# Patient Record
Sex: FEMALE | Race: WHITE | NOT HISPANIC OR LATINO | Employment: OTHER | URBAN - METROPOLITAN AREA
[De-identification: names, ages, dates, MRNs, and addresses within clinical notes are randomized per-mention and may not be internally consistent; named-entity substitution may affect disease eponyms.]

---

## 2021-04-06 DIAGNOSIS — Z23 ENCOUNTER FOR IMMUNIZATION: ICD-10-CM

## 2021-04-16 ENCOUNTER — IMMUNIZATIONS (OUTPATIENT)
Dept: FAMILY MEDICINE CLINIC | Facility: HOSPITAL | Age: 66
End: 2021-04-16

## 2021-04-16 DIAGNOSIS — Z23 ENCOUNTER FOR IMMUNIZATION: Primary | ICD-10-CM

## 2021-04-16 PROCEDURE — 0001A SARS-COV-2 / COVID-19 MRNA VACCINE (PFIZER-BIONTECH) 30 MCG: CPT

## 2021-04-16 PROCEDURE — 91300 SARS-COV-2 / COVID-19 MRNA VACCINE (PFIZER-BIONTECH) 30 MCG: CPT

## 2021-05-11 ENCOUNTER — IMMUNIZATIONS (OUTPATIENT)
Dept: FAMILY MEDICINE CLINIC | Facility: HOSPITAL | Age: 66
End: 2021-05-11

## 2021-05-11 DIAGNOSIS — Z23 ENCOUNTER FOR IMMUNIZATION: Primary | ICD-10-CM

## 2021-05-11 PROCEDURE — 91300 SARS-COV-2 / COVID-19 MRNA VACCINE (PFIZER-BIONTECH) 30 MCG: CPT

## 2021-05-11 PROCEDURE — 0002A SARS-COV-2 / COVID-19 MRNA VACCINE (PFIZER-BIONTECH) 30 MCG: CPT

## 2022-05-11 ENCOUNTER — HOSPITAL ENCOUNTER (INPATIENT)
Facility: HOSPITAL | Age: 67
LOS: 3 days | Discharge: HOME | DRG: 663 | End: 2022-05-14
Attending: EMERGENCY MEDICINE | Admitting: INTERNAL MEDICINE
Payer: COMMERCIAL

## 2022-05-11 ENCOUNTER — APPOINTMENT (EMERGENCY)
Dept: RADIOLOGY | Facility: HOSPITAL | Age: 67
DRG: 663 | End: 2022-05-11
Payer: COMMERCIAL

## 2022-05-11 ENCOUNTER — OFFICE VISIT (OUTPATIENT)
Dept: URGENT CARE | Facility: CLINIC | Age: 67
DRG: 663 | End: 2022-05-11
Payer: COMMERCIAL

## 2022-05-11 ENCOUNTER — APPOINTMENT (INPATIENT)
Dept: NON INVASIVE DIAGNOSTICS | Facility: HOSPITAL | Age: 67
DRG: 663 | End: 2022-05-11
Payer: COMMERCIAL

## 2022-05-11 VITALS
BODY MASS INDEX: 33.6 KG/M2 | HEART RATE: 113 BPM | SYSTOLIC BLOOD PRESSURE: 153 MMHG | WEIGHT: 240 LBS | DIASTOLIC BLOOD PRESSURE: 68 MMHG | OXYGEN SATURATION: 100 % | RESPIRATION RATE: 24 BRPM | TEMPERATURE: 98 F | HEIGHT: 71 IN

## 2022-05-11 DIAGNOSIS — I50.9 CHF EXACERBATION (HCC): ICD-10-CM

## 2022-05-11 DIAGNOSIS — R06.00 DYSPNEA: ICD-10-CM

## 2022-05-11 DIAGNOSIS — D50.9 MICROCYTIC ANEMIA: ICD-10-CM

## 2022-05-11 DIAGNOSIS — R60.0 LOWER EXTREMITY EDEMA: ICD-10-CM

## 2022-05-11 DIAGNOSIS — R60.9 EDEMA: ICD-10-CM

## 2022-05-11 DIAGNOSIS — D64.9 SYMPTOMATIC ANEMIA: Primary | ICD-10-CM

## 2022-05-11 DIAGNOSIS — I10 PRIMARY HYPERTENSION: ICD-10-CM

## 2022-05-11 DIAGNOSIS — D50.9 IRON DEFICIENCY ANEMIA: ICD-10-CM

## 2022-05-11 DIAGNOSIS — R06.00 DOE (DYSPNEA ON EXERTION): Primary | ICD-10-CM

## 2022-05-11 DIAGNOSIS — R06.00 DOE (DYSPNEA ON EXERTION): ICD-10-CM

## 2022-05-11 PROBLEM — R06.09 DOE (DYSPNEA ON EXERTION): Status: ACTIVE | Noted: 2022-05-11

## 2022-05-11 LAB
2HR DELTA HS TROPONIN: 1 NG/L
4HR DELTA HS TROPONIN: 3 NG/L
ABO GROUP BLD: NORMAL
ABO GROUP BLD: NORMAL
ALBUMIN SERPL BCP-MCNC: 3 G/DL (ref 3.5–5)
ALP SERPL-CCNC: 132 U/L (ref 46–116)
ALT SERPL W P-5'-P-CCNC: 59 U/L (ref 12–78)
ANION GAP SERPL CALCULATED.3IONS-SCNC: 11 MMOL/L (ref 4–13)
AORTIC ROOT: 3.4 CM
APICAL FOUR CHAMBER EJECTION FRACTION: 55 %
ASCENDING AORTA: 3.4 CM
AST SERPL W P-5'-P-CCNC: 47 U/L (ref 5–45)
BASOPHILS # BLD AUTO: 0.03 THOUSANDS/ΜL (ref 0–0.1)
BASOPHILS NFR BLD AUTO: 0 % (ref 0–1)
BILIRUB SERPL-MCNC: 0.82 MG/DL (ref 0.2–1)
BLD GP AB SCN SERPL QL: NEGATIVE
BUN SERPL-MCNC: 15 MG/DL (ref 5–25)
CALCIUM ALBUM COR SERPL-MCNC: 9.5 MG/DL (ref 8.3–10.1)
CALCIUM SERPL-MCNC: 8.7 MG/DL (ref 8.3–10.1)
CARDIAC TROPONIN I PNL SERPL HS: 10 NG/L
CARDIAC TROPONIN I PNL SERPL HS: 11 NG/L
CARDIAC TROPONIN I PNL SERPL HS: 13 NG/L
CHLORIDE SERPL-SCNC: 109 MMOL/L (ref 100–108)
CO2 SERPL-SCNC: 19 MMOL/L (ref 21–32)
CREAT SERPL-MCNC: 0.9 MG/DL (ref 0.6–1.3)
E WAVE DECELERATION TIME: 184 MS
EOSINOPHIL # BLD AUTO: 0.02 THOUSAND/ΜL (ref 0–0.61)
EOSINOPHIL NFR BLD AUTO: 0 % (ref 0–6)
ERYTHROCYTE [DISTWIDTH] IN BLOOD BY AUTOMATED COUNT: 21.9 % (ref 11.6–15.1)
FERRITIN SERPL-MCNC: 3 NG/ML (ref 8–388)
FRACTIONAL SHORTENING: 35 % (ref 28–44)
GFR SERPL CREATININE-BSD FRML MDRD: 67 ML/MIN/1.73SQ M
GLUCOSE SERPL-MCNC: 152 MG/DL (ref 65–140)
HCT VFR BLD AUTO: 12.7 % (ref 34.8–46.1)
HCT VFR BLD AUTO: 16 % (ref 34.8–46.1)
HGB BLD-MCNC: 3.2 G/DL (ref 11.5–15.4)
HGB BLD-MCNC: 4.2 G/DL (ref 11.5–15.4)
IMM GRANULOCYTES # BLD AUTO: 0.05 THOUSAND/UL (ref 0–0.2)
IMM GRANULOCYTES NFR BLD AUTO: 1 % (ref 0–2)
INTERVENTRICULAR SEPTUM IN DIASTOLE (PARASTERNAL SHORT AXIS VIEW): 1.1 CM
INTERVENTRICULAR SEPTUM: 1.1 CM (ref 0.6–1.1)
IRON SATN MFR SERPL: 3 % (ref 15–50)
IRON SERPL-MCNC: 12 UG/DL (ref 50–170)
LAAS-AP4: 26.3 CM2
LDH SERPL-CCNC: 283 U/L (ref 81–234)
LEFT ATRIUM SIZE: 3.9 CM
LEFT INTERNAL DIMENSION IN SYSTOLE: 3.3 CM (ref 2.1–4)
LEFT VENTRICULAR INTERNAL DIMENSION IN DIASTOLE: 5.1 CM (ref 3.5–6)
LEFT VENTRICULAR POSTERIOR WALL IN END DIASTOLE: 0.9 CM
LEFT VENTRICULAR STROKE VOLUME: 80 ML
LVSV (TEICH): 80 ML
LYMPHOCYTES # BLD AUTO: 1.31 THOUSANDS/ΜL (ref 0.6–4.47)
LYMPHOCYTES NFR BLD AUTO: 15 % (ref 14–44)
MCH RBC QN AUTO: 14.5 PG (ref 26.8–34.3)
MCHC RBC AUTO-ENTMCNC: 25.2 G/DL (ref 31.4–37.4)
MCV RBC AUTO: 58 FL (ref 82–98)
MONOCYTES # BLD AUTO: 0.61 THOUSAND/ΜL (ref 0.17–1.22)
MONOCYTES NFR BLD AUTO: 7 % (ref 4–12)
MV E'TISSUE VEL-SEP: 11 CM/S
MV PEAK A VEL: 1.32 M/S
MV PEAK E VEL: 117 CM/S
MV STENOSIS PRESSURE HALF TIME: 53 MS
MV VALVE AREA P 1/2 METHOD: 4.15 CM2
NEUTROPHILS # BLD AUTO: 6.85 THOUSANDS/ΜL (ref 1.85–7.62)
NEUTS SEG NFR BLD AUTO: 77 % (ref 43–75)
NRBC BLD AUTO-RTO: 3 /100 WBCS
NT-PROBNP SERPL-MCNC: 369 PG/ML
PLATELET # BLD AUTO: 469 THOUSANDS/UL (ref 149–390)
PMV BLD AUTO: 11 FL (ref 8.9–12.7)
POTASSIUM SERPL-SCNC: 3.4 MMOL/L (ref 3.5–5.3)
PROT SERPL-MCNC: 6.6 G/DL (ref 6.4–8.2)
RBC # BLD AUTO: 2.21 MILLION/UL (ref 3.81–5.12)
RETICS # AUTO: ABNORMAL 10*3/UL (ref 14097–95744)
RETICS # CALC: 3.19 % (ref 0.37–1.87)
RH BLD: POSITIVE
RH BLD: POSITIVE
RIGHT ATRIUM AREA SYSTOLE A4C: 21.8 CM2
RIGHT VENTRICLE ID DIMENSION: 4.3 CM
SL CV LV EF: 65
SL CV PED ECHO LEFT VENTRICLE DIASTOLIC VOLUME (MOD BIPLANE) 2D: 123 ML
SL CV PED ECHO LEFT VENTRICLE SYSTOLIC VOLUME (MOD BIPLANE) 2D: 43 ML
SODIUM SERPL-SCNC: 139 MMOL/L (ref 136–145)
SPECIMEN EXPIRATION DATE: NORMAL
TIBC SERPL-MCNC: 467 UG/DL (ref 250–450)
TR MAX PG: 39 MMHG
TR PEAK VELOCITY: 3.1 M/S
TRICUSPID VALVE PEAK REGURGITATION VELOCITY: 3.1 M/S
WBC # BLD AUTO: 8.87 THOUSAND/UL (ref 4.31–10.16)

## 2022-05-11 PROCEDURE — 82728 ASSAY OF FERRITIN: CPT | Performed by: STUDENT IN AN ORGANIZED HEALTH CARE EDUCATION/TRAINING PROGRAM

## 2022-05-11 PROCEDURE — 86901 BLOOD TYPING SEROLOGIC RH(D): CPT

## 2022-05-11 PROCEDURE — 84484 ASSAY OF TROPONIN QUANT: CPT

## 2022-05-11 PROCEDURE — 86920 COMPATIBILITY TEST SPIN: CPT

## 2022-05-11 PROCEDURE — 93306 TTE W/DOPPLER COMPLETE: CPT | Performed by: INTERNAL MEDICINE

## 2022-05-11 PROCEDURE — 85014 HEMATOCRIT: CPT | Performed by: STUDENT IN AN ORGANIZED HEALTH CARE EDUCATION/TRAINING PROGRAM

## 2022-05-11 PROCEDURE — 93005 ELECTROCARDIOGRAM TRACING: CPT

## 2022-05-11 PROCEDURE — 83540 ASSAY OF IRON: CPT | Performed by: STUDENT IN AN ORGANIZED HEALTH CARE EDUCATION/TRAINING PROGRAM

## 2022-05-11 PROCEDURE — 83880 ASSAY OF NATRIURETIC PEPTIDE: CPT

## 2022-05-11 PROCEDURE — 86900 BLOOD TYPING SEROLOGIC ABO: CPT

## 2022-05-11 PROCEDURE — P9016 RBC LEUKOCYTES REDUCED: HCPCS

## 2022-05-11 PROCEDURE — 93306 TTE W/DOPPLER COMPLETE: CPT

## 2022-05-11 PROCEDURE — G0382 LEV 3 HOSP TYPE B ED VISIT: HCPCS | Performed by: FAMILY MEDICINE

## 2022-05-11 PROCEDURE — 85018 HEMOGLOBIN: CPT | Performed by: STUDENT IN AN ORGANIZED HEALTH CARE EDUCATION/TRAINING PROGRAM

## 2022-05-11 PROCEDURE — 36430 TRANSFUSION BLD/BLD COMPNT: CPT

## 2022-05-11 PROCEDURE — 83615 LACTATE (LD) (LDH) ENZYME: CPT | Performed by: STUDENT IN AN ORGANIZED HEALTH CARE EDUCATION/TRAINING PROGRAM

## 2022-05-11 PROCEDURE — 36415 COLL VENOUS BLD VENIPUNCTURE: CPT

## 2022-05-11 PROCEDURE — 80053 COMPREHEN METABOLIC PANEL: CPT

## 2022-05-11 PROCEDURE — 86850 RBC ANTIBODY SCREEN: CPT

## 2022-05-11 PROCEDURE — 85025 COMPLETE CBC W/AUTO DIFF WBC: CPT

## 2022-05-11 PROCEDURE — 71046 X-RAY EXAM CHEST 2 VIEWS: CPT

## 2022-05-11 PROCEDURE — 84484 ASSAY OF TROPONIN QUANT: CPT | Performed by: STUDENT IN AN ORGANIZED HEALTH CARE EDUCATION/TRAINING PROGRAM

## 2022-05-11 PROCEDURE — 99285 EMERGENCY DEPT VISIT HI MDM: CPT | Performed by: EMERGENCY MEDICINE

## 2022-05-11 PROCEDURE — 30233N1 TRANSFUSION OF NONAUTOLOGOUS RED BLOOD CELLS INTO PERIPHERAL VEIN, PERCUTANEOUS APPROACH: ICD-10-PCS | Performed by: STUDENT IN AN ORGANIZED HEALTH CARE EDUCATION/TRAINING PROGRAM

## 2022-05-11 PROCEDURE — 80053 COMPREHEN METABOLIC PANEL: CPT | Performed by: STUDENT IN AN ORGANIZED HEALTH CARE EDUCATION/TRAINING PROGRAM

## 2022-05-11 PROCEDURE — 85045 AUTOMATED RETICULOCYTE COUNT: CPT | Performed by: STUDENT IN AN ORGANIZED HEALTH CARE EDUCATION/TRAINING PROGRAM

## 2022-05-11 PROCEDURE — 83550 IRON BINDING TEST: CPT | Performed by: STUDENT IN AN ORGANIZED HEALTH CARE EDUCATION/TRAINING PROGRAM

## 2022-05-11 PROCEDURE — 99285 EMERGENCY DEPT VISIT HI MDM: CPT

## 2022-05-11 PROCEDURE — 96374 THER/PROPH/DIAG INJ IV PUSH: CPT

## 2022-05-11 PROCEDURE — 85049 AUTOMATED PLATELET COUNT: CPT | Performed by: INTERNAL MEDICINE

## 2022-05-11 RX ORDER — LABETALOL HYDROCHLORIDE 5 MG/ML
10 INJECTION, SOLUTION INTRAVENOUS EVERY 4 HOURS PRN
Status: DISCONTINUED | OUTPATIENT
Start: 2022-05-11 | End: 2022-05-14 | Stop reason: HOSPADM

## 2022-05-11 RX ORDER — ENOXAPARIN SODIUM 100 MG/ML
40 INJECTION SUBCUTANEOUS DAILY
Status: DISCONTINUED | OUTPATIENT
Start: 2022-05-11 | End: 2022-05-14 | Stop reason: HOSPADM

## 2022-05-11 RX ORDER — HYDRALAZINE HYDROCHLORIDE 20 MG/ML
5 INJECTION INTRAMUSCULAR; INTRAVENOUS EVERY 6 HOURS PRN
Status: DISCONTINUED | OUTPATIENT
Start: 2022-05-11 | End: 2022-05-14 | Stop reason: HOSPADM

## 2022-05-11 RX ORDER — POLYETHYLENE GLYCOL 3350 17 G/17G
17 POWDER, FOR SOLUTION ORAL DAILY
Status: DISCONTINUED | OUTPATIENT
Start: 2022-05-11 | End: 2022-05-14 | Stop reason: HOSPADM

## 2022-05-11 RX ORDER — SENNOSIDES 8.6 MG
1 TABLET ORAL DAILY
Status: DISCONTINUED | OUTPATIENT
Start: 2022-05-11 | End: 2022-05-14 | Stop reason: HOSPADM

## 2022-05-11 RX ORDER — LOSARTAN POTASSIUM 50 MG/1
50 TABLET ORAL DAILY
Status: DISCONTINUED | OUTPATIENT
Start: 2022-05-11 | End: 2022-05-13

## 2022-05-11 RX ORDER — NITROGLYCERIN 0.4 MG/1
0.4 TABLET SUBLINGUAL
Status: DISCONTINUED | OUTPATIENT
Start: 2022-05-11 | End: 2022-05-14 | Stop reason: HOSPADM

## 2022-05-11 RX ORDER — FUROSEMIDE 10 MG/ML
40 INJECTION INTRAMUSCULAR; INTRAVENOUS ONCE
Status: COMPLETED | OUTPATIENT
Start: 2022-05-11 | End: 2022-05-11

## 2022-05-11 RX ORDER — POTASSIUM CHLORIDE 20 MEQ/1
20 TABLET, EXTENDED RELEASE ORAL DAILY
Status: DISCONTINUED | OUTPATIENT
Start: 2022-05-11 | End: 2022-05-14 | Stop reason: HOSPADM

## 2022-05-11 RX ORDER — FUROSEMIDE 10 MG/ML
40 INJECTION INTRAMUSCULAR; INTRAVENOUS 2 TIMES DAILY
Status: DISCONTINUED | OUTPATIENT
Start: 2022-05-11 | End: 2022-05-14 | Stop reason: HOSPADM

## 2022-05-11 RX ORDER — ALBUTEROL SULFATE 90 UG/1
2 AEROSOL, METERED RESPIRATORY (INHALATION) EVERY 4 HOURS PRN
Status: DISCONTINUED | OUTPATIENT
Start: 2022-05-11 | End: 2022-05-14 | Stop reason: HOSPADM

## 2022-05-11 RX ADMIN — ENOXAPARIN SODIUM 40 MG: 40 INJECTION SUBCUTANEOUS at 16:39

## 2022-05-11 RX ADMIN — LOSARTAN POTASSIUM 50 MG: 50 TABLET, FILM COATED ORAL at 16:54

## 2022-05-11 RX ADMIN — POTASSIUM CHLORIDE 20 MEQ: 1500 TABLET, EXTENDED RELEASE ORAL at 16:39

## 2022-05-11 RX ADMIN — HYDRALAZINE HYDROCHLORIDE 5 MG: 20 INJECTION, SOLUTION INTRAMUSCULAR; INTRAVENOUS at 19:05

## 2022-05-11 RX ADMIN — FUROSEMIDE 40 MG: 10 INJECTION, SOLUTION INTRAMUSCULAR; INTRAVENOUS at 19:06

## 2022-05-11 RX ADMIN — FUROSEMIDE 40 MG: 10 INJECTION, SOLUTION INTRAMUSCULAR; INTRAVENOUS at 14:04

## 2022-05-11 RX ADMIN — Medication 12.5 MG: at 21:11

## 2022-05-11 NOTE — ED NOTES
Pt updated on plan of care  Awaiting blood work to result to send for blood for transfusion pt verbalized understanding   Admitting provider at bedside      Unruly Bañuelos RN  05/11/22 0372

## 2022-05-11 NOTE — PROGRESS NOTES
[facility]        NAME: Elis Cruz is a 61 y o  female  : 1955    MRN: [id number]  DATE: May 11, 2022  TIME: 1:09 PM    Assessment and Plan   FALLON (dyspnea on exertion) [R06 00]  1  FALLON (dyspnea on exertion)           Patient Instructions   Tachycardia at rate of 130s with O2sat low 90s with short ambulation around the nurses station in the office  3+ pitting edema on the lower extremities bilaterally, skin is extremely pale  ENT has been called and patient will be transported to the emergency room for further evaluation and management  Patient in agreement  Follow up with PCP in 3-5 days  Proceed to  ER if symptoms worsen  Chief Complaint     Chief Complaint   Patient presents with    Shortness of Breath     Pt states that over the last week she has been very short of breath with any type of exertion  Pt also states that her legs have swollen so big that she can not fit into her pants  Lower Legs show pitting edema, Pt estimates that she has gained approx 20 lbs over the week  History of Present Illness       61-year-old female with 1 week history of increasing shortness of breath and increased swelling of her legs  She states that her legs have swollen so much that none of her pants now fit her as of the past two weeks  She states with walking she gets increasingly short of breath and chest tightness  She states when she sits and does not move her symptoms quickly resolved  Denies any headaches nausea or vomiting  Review of Systems   Review of Systems   Constitutional: Positive for diaphoresis and fatigue  Negative for fever  HENT: Negative  Eyes: Negative  Respiratory: Positive for chest tightness, shortness of breath and wheezing  Cardiovascular: Positive for leg swelling  Gastrointestinal: Negative  Genitourinary: Negative  Skin: Positive for color change  Allergic/Immunologic: Negative  Neurological: Negative      Hematological: Negative  Psychiatric/Behavioral: Negative  Current Medications     No current outpatient medications on file  Current Allergies     Allergies as of 05/11/2022 - Reviewed 05/11/2022   Allergen Reaction Noted    Penicillins Hives 05/11/2022    Shellfish-derived products - food allergy GI Intolerance 05/11/2022    Latex Rash 05/11/2022            The following portions of the patient's history were reviewed and updated as appropriate: allergies, current medications, past family history, past medical history, past social history, past surgical history and problem list      History reviewed  No pertinent past medical history  Past Surgical History:   Procedure Laterality Date    TONSILLECTOMY         History reviewed  No pertinent family history  Medications have been verified  Objective   /68   Pulse (!) 113   Temp 98 °F (36 7 °C)   Resp (!) 24   Ht 5' 11" (1 803 m)   Wt 109 kg (240 lb)   SpO2 100%   BMI 33 47 kg/m²   No LMP recorded  Physical Exam     Physical Exam  Vitals and nursing note reviewed  Constitutional:       Appearance: She is well-developed  HENT:      Head: Normocephalic  Eyes:      Pupils: Pupils are equal, round, and reactive to light  Cardiovascular:      Rate and Rhythm: Regular rhythm  Tachycardia present  Pulmonary:      Effort: Pulmonary effort is normal  Tachypnea present  Breath sounds: Examination of the right-lower field reveals wheezing  Examination of the left-lower field reveals wheezing  Wheezing present  Abdominal:      Tenderness: There is no abdominal tenderness  Musculoskeletal:         General: Normal range of motion  Right lower leg: Edema present  Left lower leg: Edema present  Skin:     General: Skin is warm and dry  Capillary Refill: Capillary refill takes 2 to 3 seconds  Coloration: Skin is pale  Neurological:      Mental Status: She is alert and oriented to person, place, and time

## 2022-05-11 NOTE — ED PROVIDER NOTES
History  Chief Complaint   Patient presents with    Shortness of Breath     PT reports increased SOB and leg swelling for the past few weeks  Asha Haider is a 61 y o  with no known PMH who presents with complaints of shortness of breath  She reports this has been going on for the past several weeks  She reports associated cough, swelling of her legs, dyspnea on exertion  She has no known cardiac history and has never seen a cardiologist   She denies any recent hospitalizations in the past year  She denies any fevers, chills, sick contacts, episodes syncope, abdominal pain, nausea, vomiting, diaphoresis  She is vaccinated against COVID  She reports she is still making urine however she is urinating less  She has no known history of kidney disease  She does not take any medications regularly  She has not seen a primary care physician for many years  The patient also denies any symptoms of fevers, chills, headache, vision changes, dizziness,weakness in the arms or legs, blood in the urine or stool, difficulties walking, or sick contacts  The pt has no other complaints at this time  Pt denies any PMH or PSH          None       No past medical history on file  Past Surgical History:   Procedure Laterality Date    TONSILLECTOMY         No family history on file  I have reviewed and agree with the history as documented  E-Cigarette/Vaping     E-Cigarette/Vaping Substances     Social History     Tobacco Use    Smoking status: Never Smoker    Smokeless tobacco: Never Used        Review of Systems   Constitutional: Negative for chills and fever  HENT: Negative for ear pain and sore throat  Eyes: Negative for pain and visual disturbance  Respiratory: Positive for cough and shortness of breath  Cardiovascular: Negative for chest pain and palpitations  Gastrointestinal: Negative for abdominal pain and vomiting  Genitourinary: Negative for dysuria and hematuria  Musculoskeletal: Negative for back pain  Skin: Positive for color change  Negative for rash  Neurological: Negative for syncope  All other systems reviewed and are negative  Physical Exam  ED Triage Vitals   Temperature Pulse Respirations Blood Pressure SpO2   05/11/22 1339 05/11/22 1339 05/11/22 1339 05/11/22 1340 05/11/22 1339   98 1 °F (36 7 °C) (!) 113 22 (!) 177/77 100 %      Temp Source Heart Rate Source Patient Position - Orthostatic VS BP Location FiO2 (%)   05/11/22 1339 05/11/22 1339 05/11/22 1339 05/11/22 1339 --   Oral Monitor Lying Right arm       Pain Score       05/11/22 1339       No Pain             Orthostatic Vital Signs  Vitals:    05/12/22 0842 05/12/22 0905 05/12/22 1224 05/12/22 1225   BP: 161/88  139/86 139/86   Pulse: 80 73 74 74   Patient Position - Orthostatic VS:           Physical Exam  Vitals and nursing note reviewed  Constitutional:       General: She is not in acute distress  Appearance: She is well-developed  HENT:      Head: Normocephalic and atraumatic  Eyes:      Extraocular Movements: Extraocular movements intact  Conjunctiva/sclera: Conjunctivae normal    Cardiovascular:      Rate and Rhythm: Regular rhythm  Tachycardia present  Heart sounds: No murmur heard  Pulmonary:      Effort: Pulmonary effort is normal  No respiratory distress  Breath sounds: Rales present  No decreased breath sounds, wheezing or rhonchi  Abdominal:      Palpations: Abdomen is soft  Tenderness: There is no abdominal tenderness  There is no guarding or rebound  Musculoskeletal:      Cervical back: Neck supple  Right lower leg: Edema present  Left lower leg: Edema present  Comments: Bilateral 3+ pitting edema up to mid thigh   Skin:     General: Skin is warm and dry  Coloration: Skin is pale  Neurological:      General: No focal deficit present  Mental Status: She is alert           ED Medications  Medications   senna (SENOKOT) tablet 8 6 mg (8 6 mg Oral Not Given 5/12/22 0907)   polyethylene glycol (MIRALAX) packet 17 g (17 g Oral Not Given 5/12/22 0907)   furosemide (LASIX) injection 40 mg (40 mg Intravenous Given 5/12/22 0906)   nitroglycerin (NITROSTAT) SL tablet 0 4 mg (has no administration in time range)   losartan (COZAAR) tablet 50 mg (50 mg Oral Given 5/12/22 0906)   enoxaparin (LOVENOX) subcutaneous injection 40 mg (40 mg Subcutaneous Given 5/12/22 0907)   potassium chloride (K-DUR,KLOR-CON) CR tablet 20 mEq (20 mEq Oral Given 5/12/22 0907)   hydrALAZINE (APRESOLINE) injection 5 mg (5 mg Intravenous Given 5/11/22 1905)   labetalol (NORMODYNE) injection 10 mg (has no administration in time range)   albuterol (PROVENTIL HFA,VENTOLIN HFA) inhaler 2 puff (2 puffs Inhalation Given 5/12/22 0218)   polyethylene glycol (GOLYTELY) bowel prep 4,000 mL (has no administration in time range)   bisacodyl (DULCOLAX) EC tablet 20 mg (has no administration in time range)   bisacodyl (DULCOLAX) EC tablet 20 mg (has no administration in time range)   magnesium citrate (CITROMA) oral solution 296 mL (has no administration in time range)   furosemide (LASIX) injection 40 mg (40 mg Intravenous Given 5/11/22 1404)   potassium chloride (K-DUR,KLOR-CON) CR tablet 40 mEq (40 mEq Oral Given 5/12/22 0311)   ondansetron (ZOFRAN) injection 4 mg (4 mg Intravenous Given 5/12/22 0233)   furosemide (LASIX) injection 40 mg (40 mg Intravenous Given 5/12/22 0614)   magnesium citrate (CITROMA) oral solution 296 mL (296 mL Oral Given 5/12/22 1157)       Diagnostic Studies  Results Reviewed     Procedure Component Value Units Date/Time    TSH, 3rd generation [[id number]]  (Normal) Collected: 05/12/22 0524    Lab Status: Final result Specimen: Blood from Arm, Left Updated: 05/12/22 0637     TSH 3RD GENERATON 1 100 uIU/mL     Narrative:      Patients undergoing fluorescein dye angiography may retain small amounts of fluorescein in the body for 48-72 hours post procedure  Samples containing fluorescein can produce falsely depressed TSH values  If the patient had this procedure,a specimen should be resubmitted post fluorescein clearance        Basic metabolic panel [[id number]] Collected: 05/12/22 0524    Lab Status: Final result Specimen: Blood from Arm, Left Updated: 05/12/22 3253     Sodium 139 mmol/L      Potassium 3 9 mmol/L      Chloride 108 mmol/L      CO2 24 mmol/L      ANION GAP 7 mmol/L      BUN 14 mg/dL      Creatinine 0 80 mg/dL      Glucose 107 mg/dL      Calcium 8 7 mg/dL      eGFR 78 ml/min/1 73sq m     Narrative:      [facility] guidelines for Chronic Kidney Disease (CKD):     Stage 1 with normal or high GFR (GFR > 90 mL/min/1 73 square meters)    Stage 2 Mild CKD (GFR = 60-89 mL/min/1 73 square meters)    Stage 3A Moderate CKD (GFR = 45-59 mL/min/1 73 square meters)    Stage 3B Moderate CKD (GFR = 30-44 mL/min/1 73 square meters)    Stage 4 Severe CKD (GFR = 15-29 mL/min/1 73 square meters)    Stage 5 End Stage CKD (GFR <15 mL/min/1 73 square meters)  Note: GFR calculation is accurate only with a steady state creatinine    CBC and differential [[id number]]  (Abnormal) Collected: 05/12/22 0524    Lab Status: Final result Specimen: Blood from Arm, Left Updated: 05/12/22 0602     WBC 8 50 Thousand/uL      RBC 3 26 Million/uL      Hemoglobin 6 5 g/dL      Hematocrit 22 6 %      MCV 69 fL      MCH 19 9 pg      MCHC 28 8 g/dL      RDW 30 5 %      MPV 11 4 fL      Platelets 990 Thousands/uL      nRBC 5 /100 WBCs      Neutrophils Relative 66 %      Immat GRANS % 1 %      Lymphocytes Relative 21 %      Monocytes Relative 10 %      Eosinophils Relative 1 %      Basophils Relative 1 %      Neutrophils Absolute 5 66 Thousands/µL      Immature Grans Absolute 0 10 Thousand/uL      Lymphocytes Absolute 1 74 Thousands/µL      Monocytes Absolute 0 83 Thousand/µL      Eosinophils Absolute 0 09 Thousand/µL      Basophils Absolute 0 08 Thousands/µL Hemoglobin and hematocrit, blood [[id number]]  (Abnormal) Collected: 05/11/22 2305    Lab Status: Final result Specimen: Blood from Arm, Right Updated: 05/12/22 0004     Hemoglobin 4 1 g/dL      Hematocrit 15 5 %     Hemoglobin and hematocrit, blood [[id number]]  (Abnormal) Collected: 05/11/22 2222    Lab Status: Final result Specimen: Blood from Arm, Left Updated: 05/11/22 2249     Hemoglobin 4 2 g/dL      Hematocrit 16 0 %     HS Troponin I 4hr [[id number]]  (Normal) Collected: 05/11/22 1735    Lab Status: Final result Specimen: Blood from Arm, Right Updated: 05/11/22 1811     hs TnI 4hr 13 ng/L      Delta 4hr hsTnI 3 ng/L     Retic Count [[id number]]  (Abnormal) Collected: 05/11/22 1358    Lab Status: Final result Specimen: Blood from Arm, Right Updated: 05/11/22 1716     Retic Ct Abs 71,500     Retic Ct Pct 3 19 %     HS Troponin I 2hr [[id number]]  (Normal) Collected: 05/11/22 1612    Lab Status: Final result Specimen: Blood from Arm, Right Updated: 05/11/22 1651     hs TnI 2hr 11 ng/L      Delta 2hr hsTnI 1 ng/L     Iron Saturation % [[id number]]  (Abnormal) Collected: 05/11/22 1358    Lab Status: Final result Specimen: Blood from Arm, Right Updated: 05/11/22 1645     Iron Saturation 3 %      TIBC 467 ug/dL      Iron 12 ug/dL     Lactate dehydrogenase [[id number]]  (Abnormal) Collected: 05/11/22 1358    Lab Status: Final result Specimen: Blood from Arm, Right Updated: 05/11/22 1645      U/L     Ferritin [[id number]]  (Abnormal) Collected: 05/11/22 1358    Lab Status: Final result Specimen: Blood from Arm, Right Updated: 05/11/22 1645     Ferritin 3 ng/mL     HS Troponin 0hr (reflex protocol) [[id number]]  (Normal) Collected: 05/11/22 1358    Lab Status: Final result Specimen: Blood from Arm, Right Updated: 05/11/22 1436     hs TnI 0hr 10 ng/L     Comprehensive metabolic panel [[id number]]  (Abnormal) Collected: 05/11/22 1829    Lab Status: Final result Specimen: Blood from Arm, Right Updated: 05/11/22 2852 Sodium 139 mmol/L      Potassium 3 4 mmol/L      Chloride 109 mmol/L      CO2 19 mmol/L      ANION GAP 11 mmol/L      BUN 15 mg/dL      Creatinine 0 90 mg/dL      Glucose 152 mg/dL      Calcium 8 7 mg/dL      Corrected Calcium 9 5 mg/dL      AST 47 U/L      ALT 59 U/L      Alkaline Phosphatase 132 U/L      Total Protein 6 6 g/dL      Albumin 3 0 g/dL      Total Bilirubin 0 82 mg/dL      eGFR 67 ml/min/1 73sq m     Narrative:      [employer] guidelines for Chronic Kidney Disease (CKD):     Stage 1 with normal or high GFR (GFR > 90 mL/min/1 73 square meters)    Stage 2 Mild CKD (GFR = 60-89 mL/min/1 73 square meters)    Stage 3A Moderate CKD (GFR = 45-59 mL/min/1 73 square meters)    Stage 3B Moderate CKD (GFR = 30-44 mL/min/1 73 square meters)    Stage 4 Severe CKD (GFR = 15-29 mL/min/1 73 square meters)    Stage 5 End Stage CKD (GFR <15 mL/min/1 73 square meters)  Note: GFR calculation is accurate only with a steady state creatinine    NT-BNP PRO [[id number]]  (Abnormal) Collected: 05/11/22 1358    Lab Status: Final result Specimen: Blood from Arm, Right Updated: 05/11/22 1430     NT-proBNP 369 pg/mL     CBC and differential [[id number]]  (Abnormal) Collected: 05/11/22 1358    Lab Status: Final result Specimen: Blood from Arm, Right Updated: 05/11/22 1422     WBC 8 87 Thousand/uL      RBC 2 21 Million/uL      Hemoglobin 3 2 g/dL      Hematocrit 12 7 %      MCV 58 fL      MCH 14 5 pg      MCHC 25 2 g/dL      RDW 21 9 %      MPV 11 0 fL      Platelets 990 Thousands/uL      nRBC 3 /100 WBCs      Neutrophils Relative 77 %      Immat GRANS % 1 %      Lymphocytes Relative 15 %      Monocytes Relative 7 %      Eosinophils Relative 0 %      Basophils Relative 0 %      Neutrophils Absolute 6 85 Thousands/µL      Immature Grans Absolute 0 05 Thousand/uL      Lymphocytes Absolute 1 31 Thousands/µL      Monocytes Absolute 0 61 Thousand/µL      Eosinophils Absolute 0 02 Thousand/µL      Basophils Absolute 0 03 Thousands/µL                  CT abdomen pelvis wo contrast   Final Result by Mariam Oh MD (05/12 4103)      No acute intra-abdominal pathology to account for anemia  Specifically, no intraperitoneal or retroperitoneal hematoma  Hiatal hernia  The entire gastric fundus is contained within the hernia sac and the gastric wall is somewhat thickened  Trace left greater than right pleural effusions  Mild body wall edema  Cholelithiasis  Few scattered colonic diverticula  Workstation performed: [id number]         XR chest 2 views   ED Interpretation by Elpidio Patton DO (05/11 0600)   Abnormal   Chest x-ray interpreted me shows bilateral pleural effusions, left greater than right, trace pulmonary vascular congestion, no focal consolidation, no old available for comparison      Final Result by Jen Morillo MD (05/11 1027)      Small bilateral pleural effusions (left worse than right) with bibasilar atelectasis  Workstation performed: [id number]               Procedures  Procedures      ED Course  ED Course as of 05/12/22 1417   Wed May 11, 2022   1423 Hemoglobin(!!): 3 2   1425 Transfusing 2 U   1434 NT-proBNP(!): 369   1437 Glucose, Random(!): 152   1455 Will get the pt admitted to SOD                          Initial Sepsis Screening     Row Name 05/11/22 1424                Is the patient's history suggestive of a new or worsening infection?  No  -AR        Suspected source of infection --        Are two or more of the following signs & symptoms of infection both present and new to the patient? --        Indicate SIRS criteria --        If the answer is yes to both questions, suspicion of sepsis is present --        If severe sepsis is present AND tissue hypoperfusion perists in the hour after fluid resuscitation or lactate > 4, the patient meets criteria for SEPTIC SHOCK --        Are any of the following organ dysfunction criteria present within 6 hours of suspected infection and SIRS criteria that are NOT considered to be chronic conditions? --        Organ dysfunction --        Date of presentation of severe sepsis --        Time of presentation of severe sepsis --        Tissue hypoperfusion persists in the hour after crystalloid fluid administration, evidenced, by either: --        Was hypotension present within one hour of the conclusion of crystalloid fluid administration? --        Date of presentation of septic shock --        Time of presentation of septic shock --              User Key  (r) = Recorded By, (t) = Taken By, (c) = Cosigned By    [id number]  Name Provider Type    [address] [place] [address] Physician                          MDM  Number of Diagnoses or Management Options  CHF exacerbation ([facility] )  Dyspnea  Edema  Symptomatic anemia  Diagnosis management comments: Gadiel Chong is a 61 y o  who presents with complaints of shortness of breath    Vital signs are remarkable for tachycardia, hypertension, physical exam shows the patient is pale and exhibited anasarca    ED Initial Impression:  Overall this appears consistent with a classic CHF exacerbation, however the differential also includes anemia versus ACS versus COPD versus infectious etiology versus other  ED Management:  Cardiac workup, bedside ultrasound, 40 mg Lasix    Reassessment:  Patient's workup was remarkable for profound anemia of 3 2  In addition to an elevated BNP  This was consistent with symptomatic anemia  Given this in her lack of cardiac evaluation in the past   Opted to recommend admission  She was transfused 2U of PRBC in the ER  Discussed the patient's case with the SOD service who agreed to admit the patient for further care and evaluation  The patient was also stable throughout their ED course and suffered from no acute changes and had no further questions or concerns from the ED team's standpoint        I discussed the above care and treatment plan with the patient and answered all of their relevant questions  Reviewed test results and imaging findings, as well as pertinent details of the treatment plan as described above  She expressed understanding, was agreeable to the plan of care, and had no further questions or concerns  Portions of the record may have been created with voice recognition software  Occasional wrong word or "sound a like" substitutions may have occurred due to the inherent limitations of voice recognition software  Read the chart carefully and recognize, using context, where substitutions have occurred      Disposition  Final diagnoses:   Symptomatic anemia   CHF exacerbation ([facility] )   Dyspnea   Edema     Time reflects when diagnosis was documented in both MDM as applicable and the Disposition within this note     Time User Action Codes Description Comment    5/11/2022  3:03 PM Virgia Large Add [D64 9] Symptomatic anemia     5/11/2022  3:04 PM Virgia Large Add [I50 9] [facility]     5/11/2022  3:04 PM Virgia Large Add [R06 00] Dyspnea     5/11/2022  3:04 PM Virgia Large Add [R60 9] Edema     5/11/2022  3:11 PM Minnix, Hyun Add [R06 00] FALLON (dyspnea on exertion)     5/11/2022  5:11 PM Minnix, Hyun Add [D50 9] Iron deficiency anemia     5/12/2022  9:49 AM Aziza Singleton Add [D50 9] Microcytic anemia       ED Disposition     ED Disposition   Admit    Condition   Stable    Date/Time   Wed May 11, 2022  3:03 PM    Comment   Case was discussed with SOD and the patient's admission status was agreed to be Admission Status: inpatient status to the service of Dr Jannie Yee   Follow-up Information    None         There are no discharge medications for this patient  No discharge procedures on file  PDMP Review     None           ED Provider  Attending physically available and evaluated Si [place]  I managed the patient along with the ED Attending      Electronically Signed by         Zuleika Cohen MD  05/11/22 [address], [place]  05/12/22 5343

## 2022-05-11 NOTE — ED ATTENDING ATTESTATION
5/11/2022  I, Sal Reina DO, saw and evaluated the patient  I have discussed the patient with the resident/non-physician practitioner and agree with the resident's/non-physician practitioner's findings, Plan of Care, and MDM as documented in the resident's/non-physician practitioner's note, except where noted  All available labs and Radiology studies were reviewed  I was present for key portions of any procedure(s) performed by the resident/non-physician practitioner and I was immediately available to provide assistance  At this point I agree with the current assessment done in the Emergency Department  I have conducted an independent evaluation of this patient a history and physical is as follows:    Patient is a 43-year-old female accompanied by her mother  The patient says about 4 months ago she noticed some very slight dyspnea on exertion if she would climb stairs rapidly, she says that continued and has progressed in the last 3 or 4 weeks she has had much worsening dyspnea on exertion  She has also noticed now that when she climbs stairs forcefully or hard that she gets some mild chest pain which resolves when she sits and rest   She said that when she rests she is asymptomatic  She has noticed which she believes about a 20 or 30 lb weight gain secondary to significant including least fluid retention in her legs bilaterally  She says if she rests her legs and elevates them sometimes of fluid will decreased somewhat and it typically is less in the morning  She has some slight occasional cough, which is unchanged  No fever, no chills, no myalgias, no arthralgias  No palpitations  Slight orthopnea  She says that she does not go to doctors and has not seen anyone for this, prior to going to urgent care today and was sent to the ED      General:  Patient is well-appearing  Head:  Atraumatic  Eyes:  Conjunctiva pink  ENT:  Mucous membranes are moist  Neck:  Supple  Cardiac:  S1-S2, without murmurs  Lungs:  Bibasilar rales  Abdomen:  Soft, nontender, normal bowel sounds, no CVA tenderness, no tympany, no rigidity, no guarding  Extremities:  Normal range of motion, diffuse pitting pedal edema up to the thighs bilaterally  No calf asymmetry  Neurologic:  Awake, fluent speech, normal comprehension  AAOx3  Skin:  Pale warm and dry  Psychiatric:  Alert, pleasant, cooperative            ED Course  ED Course as of 05/12/22 1416   Wed May 11, 2022   1448 On reassessment, no change the above findings  Discussed with patient about her significant anemia and need for blood transfusion, she consented  She indicated she has not noticed any dark or tarry stools, no blood in the stool, no bloody noses or easy bruising elsewhere  Patient pale on exam, symptoms suggestive of CHF with significant peripheral edema, also may represent underlying anemia  Given Lasix for presumed CHF given physical examination    Chest x-ray interpreted me shows trace pulmonary vascular congestion, left greater than right pleural effusions, no old available for comparison    Labs remarkable for significant anemia, hemoglobin of 3 2, patient consented for blood transfusion, blood transfusion ordered  Patient transfused,    Patient remaining stable, admitted for further care      Critical Care Time  Procedures  I consider this patient to be critically ill given her significant anemia with hemoglobin of 3 2   32 minutes of critical care time required for the care this patient  Please see separate note for details

## 2022-05-11 NOTE — ASSESSMENT & PLAN NOTE
Hemoglobin 3 2 and MCV 58  Iron panel consistent with severe iron deficiency with single digit ferritin  Denies hematuria, hematochezia, NSAID use, hematemesis, cancer history  Never had colonoscopy  Does endorse single episode of black stool several weeks ago, has not recurred  Denies any history of abnormal vaginal bleeding  Patient does endorse decades long history of anemia, although she denies having symptoms until recently  She does not know what her baseline hemoglobin is  Patient denies any family history of anemia  Family is of [place]  descent  Received total 4 units packed RBCs with appropriate response  Status post EGD/colonoscopy on 5/13, no source of anemia identified  No intra-abdominal bleed seen on CT  Venofer x1 on 05/14    Plan  · Will discharge on ferrous sulfate  · Outpatient referral to Hematology provided, patient is motivated to follow-up  · Patient to establish care with PCP  · Repeat CBC in 1 week

## 2022-05-11 NOTE — CASE MANAGEMENT
Case Management Assessment & Discharge Planning Note    Patient name Parker Garay  Location ED 14/ED 14 MRN [id number]  : 1955 Date 2022       Current Admission Date: 2022  Current Admission Diagnosis:SOB (shortness of breath)   Patient Active Problem List    Diagnosis Date Noted    FALLON (dyspnea on exertion) 2022      LOS (days): 0  Geometric Mean LOS (GMLOS) (days):   Days to GMLOS:     OBJECTIVE:        Current admission status: Inpatient  Referral Reason: No Insurance    Preferred Pharmacy:   [id number], [address] [address] [address]  Phone: [phone] Fax: [phone]    Primary Care Provider: No primary care provider on file  Primary Insurance:   Secondary Insurance:     ASSESSMENT:  Active Health Care Proxies    There are no active Health Care Proxies on file          Readmission Root Cause  30 Day Readmission: No    Patient Information  Admitted from[de-identified] Home  Mental Status: Alert  During Assessment patient was accompanied by: Parent  Assessment information provided by[de-identified] Patient  Primary Caregiver: Self  Support Systems: Self, Parent  County of Residence: [address] [place] [address] do you live in?: [facility]  Type of Current Residence: 2 story home  In the last 12 months, was there a time when you were not able to pay the mortgage or rent on time?: No  In the last 12 months, how many places have you lived?: 1  In the last 12 months, was there a time when you did not have a steady place to sleep or slept in a shelter (including now)?: No  Homeless/housing insecurity resource given?: No  Living Arrangements: Lives w/ Parent(s)       Patient Information Continued  Income Source: Unemployed  Does patient have prescription coverage?: No  Food insecurity resource given?: No  Does patient receive dialysis treatments?: No  Does patient have a history of substance abuse?: No  Does patient have a history of Mental Health Diagnosis?: No     Means of Transportation  Means of Transport to Appts[de-identified] Family transport  In the past 12 months, has lack of transportation kept you from medical appointments or from getting medications?: No  Was application for public transport provided?: No    DISCHARGE DETAILS:    Discharge planning discussed with[de-identified] Patient  Freedom of Choice: Yes     CM contacted family/caregiver?: Yes   Contacts  Patient Contacts: Sandra Lee  Relationship to Patient[de-identified] Family  Contact Method: In Person  Reason/Outcome: Continuity of Care, Emergency Contact    Other Referral/Resources/Interventions Provided:  Interventions: PCP  Referral Comments: CM met with pt at bedside due to no insurance and no PCP listed  At bedside pt states that she does not currently have insurance but she applied for Medicare A and B and was approved for when she turns 74 in July  Pt states that due to her lack of insurance currently she was going to try to hold out on going to the doctor or hospital but her mother convinced her to come in today  CM confirmed that [employer] is aware of pt and they will complete a MA application with pt  CM spoke to pt about PCP options both with and without insurance  CM discussed [facility] [facility] with pt who see pt's who are uninsurred and have sliding scale fees  Pt was thankful for the information  Government Services[de-identified] Medical Assistance     Additional Comments: At bedside CM completed Open with pt and pt's mother  Pt's mother asked CM to step out of the room with her while pt was having testing completed and states that pt is in a difficult financial situation right now and does not have insurance  Pt's mother attempted to hand CM a credit card telling CM that she will help pt pay for her care  CM declined to take the card from pt's mother and assured her that pt will get medical care in the hospital regardless of insurance or ability to pay   CM reviewed OP PCP options such as the [facility] where they accept pt's without insurance and have sliding scale fees which make health care much more attainable for pts and families  WILSON spoke to [facility] from [address] who states that she will complete an MA application with pt at bedside

## 2022-05-11 NOTE — ASSESSMENT & PLAN NOTE
Wt Readings from Last 3 Encounters:   05/14/22 106 kg (234 lb 6 4 oz)   05/11/22 109 kg (240 lb)       Presents dyspnea on exertion, bilateral leg and abdominal swelling, PND, oliguria for the past 2 weeks  Patient has increased her fluid intake due to a dry cough  Hypertensive (177/77) and tachycardic (113) on admission, saturating adequately on room air  Bilateral 2+ pitting leg edema, JVD  In the setting of severe anemia with hemoglobin 3 2, , troponin 10  PA and lateral chest x-ray shows small bilateral pleural effusions left worse than right with bibasilar atelectasis  Echocardiogram demonstrates no heart failure, LVEF 65%, hyperdynamic  Patient responding appropriately Lasix, lower extremity edema improving  Plan  · Will discharge on 20 mg of Lasix oral daily

## 2022-05-11 NOTE — H&P
h  t T=          INTERNAL MEDICINE RESIDENCY ADMISSION H&P     Name: Dick Leong   Age & Sex: 61 y o  female   MRN: [id number]  Unit/Bed#: ED 15   Encounter: [id number]  Primary Care Provider: No primary care provider on file  Code Status: Level 1 - Full Code  Admission Status: INPATIENT   Disposition: Patient requires Med/Surg with Telemetry    Admit to team: SOD Team C     ASSESSMENT/PLAN     Active Problems:    Acute CHF (congestive heart failure) (HCC)    Microcytic anemia      Acute CHF (congestive heart failure) ([address] )  Assessment & Plan  Wt Readings from Last 3 Encounters:   05/11/22 109 kg (240 lb)     Presents dyspnea on exertion, bilateral leg and abdominal swelling, PND, oliguria for the past 2 weeks  Patient has increased her fluid intake due to a dry cough  Patient notes that dyspnea on exertion was mild and started in December but became much more severe in the last 2 weeks  Likely increased salt intake in the last couple weeks because patient's mother has cooked for her instead of patient herself  Hypertensive (177/77) and tachycardic (113) on admission, saturating adequately on room air  Bilateral 2+ pitting leg edema, JVD  CBC remarkable for hemoglobin 3 2, , troponin 10  PA and lateral chest x-ray shows small bilateral pleural effusions left worse than right with bibasilar atelectasis  Received IV Lasix 40 mg x1 with subsequent 1100 cc of urine output  Likely new diagnosis of heart failure      · IV Lasix 40 mg b i d   · Start Losartan 50 mg, metoprolol tartrate 12 5 mg b i d  K-Dur 20 mg  · PRN hydralazine 5 mg q 6 hrs PRN for SBP > 170, labetalol 10 mg q 4 hrs for SBP > 180  · Echo ordered  · Check TSH  · Follow up repeat troponin  · Strict I/O, daily weights  · Cardiac diet, 1 5 L fluid restriction  · Monitor on telemetry  · Cardiology, heart failure coordinator consulted - appreciate recommendations  · P T /OT, Nutrition consulted - appreciate recommendations  · Case management consulted      Microcytic anemia  Assessment & Plan  Hemoglobin 3 2 and MCV 58  Denies hematuria, hematochezia, NSAID use, hematemesis, cancer history  Never had colonoscopy  · 2 U PRBC ordered while in ED - has not received yet  · H&H q 6 hrs - next at 6 pm  · Continue to trend Hg  · Transfuse if Hg < 7 or if symptomatic  · Ordered iron panel, haptoglobin, LDH      VTE Pharmacologic Prophylaxis:  Enoxaparin  VTE Mechanical Prophylaxis: SCD    CHIEF COMPLAINT     Chief Complaint   Patient presents with    Shortness of Breath     PT reports increased SOB and leg swelling for the past few weeks  HISTORY OF PRESENT ILLNESS     62-year-old female with no known past medical history presented to ED on 05/11 dyspnea on exertion and leg swelling  Patient noted a dry cough and mild breathlessness when going up stairs in December shortly after receiving the [facility] booster  States that the symptoms persisted since then but in the last 2 weeks became more severe  She is able to walk about 20 ft before stopping to rest   Dyspnea on exertion is associated with bilateral shoulder pain feels like lighting a match "  She states that she has to rest for 5-7 minutes before these symptoms go away  She has noted weight gain of approximately 20 lb in her legs and abdomen that prevented her from fitting into clothes  Endorses PND with coughing  She drinks at least 3-4 water bottles a night which help control her cough  Her cough causes her to "retch" but without emesis  Patient states that she usually eats a healthy diet, however, in the last couple weeks her symptoms have prevented her from standing to cook  Her mother has been cooking for her and has not had a healthy diet  Denies fever, chills, abdominal pain, nausea, back pain, hematuria, melena, hematochezia  Notably, patient has not seen a doctor in many years  She does not take any prescribed medications    She used to take L-tyrosine to help with mood but stopped it 4-5 months ago  Denies smoking, illicit drug use, history of cancer, arrhythmias, NSAID use, colonoscopy  Drinks 1 glass of wine a week  Lives with her mother  Family history significant for father with diabetes and ALS  Vital signs in the ED:  98 1 F, /77, pulse 113, RR 22, 100% RA  CBC remarkable for hemoglobin 3 2, hematocrit 12 7, and CV 58, platelets 425  CMP remarkable for potassium 3 4, chloride 109, CO2 19, AST 47, , albumin 3    Troponin 10  EKG showed normal sinus rhythm  PA and lateral CXR showed small bilateral pleural effusions left worse than right with bibasilar atelectasis  Patient received IV Lasix 40 mg x 1  Subsequent 1100 cc urine output  2 U PRBC ordered  REVIEW OF SYSTEMS     Review of Systems   Constitutional: Positive for unexpected weight change  Negative for appetite change, chills, diaphoresis and fever  Eyes: Negative for visual disturbance  Respiratory: Positive for cough and shortness of breath  Cardiovascular: Positive for leg swelling  Negative for chest pain and palpitations  Gastrointestinal: Positive for abdominal distention  Negative for abdominal pain, blood in stool, nausea and vomiting  Genitourinary: Positive for decreased urine volume  Negative for dysuria and hematuria  Musculoskeletal: Negative for back pain  Skin: Negative for rash  Neurological: Negative for dizziness, weakness, light-headedness, numbness and headaches       OBJECTIVE     Vitals:    22 1601 22 1616 22 1635 22 1650   BP: (!) 172/79 (!) 171/77 (!) 175/79 167/77   BP Location: Right arm      Pulse: 97 88 92 92   Resp:  18 20   Temp:  98 4 °F (36 9 °C) 98 2 °F (36 8 °C) 98 6 °F (37 °C)   TempSrc:  Oral Oral Oral   SpO2: 95% 99% 96% 97%      Temperature:   Temp (24hrs), Av 3 °F (36 8 °C), Min:98 °F (36 7 °C), Max:98 6 °F (37 °C)    Temperature: 98 6 °F (37 °C)  Intake & Output:  I/O        0701  05/10 0700 05/10 0701  05/11 0700 05/11 0701  05/12 0700    Urine   2100    Total Output   2100    Net   -2100               Weights: There is no height or weight on file to calculate BMI  Weight (last 2 days)     None        Physical Exam  Vitals reviewed  Constitutional:       General: She is not in acute distress  Appearance: She is obese  HENT:      Head: Normocephalic and atraumatic  Nose: No rhinorrhea  Eyes:      General: No scleral icterus  Comments: Conjunctival pallor   Neck:      Vascular: JVD (1/3 up SCM) present  Cardiovascular:      Rate and Rhythm: Regular rhythm  Tachycardia present  Pulses: Normal pulses  Heart sounds: Normal heart sounds  No murmur heard  No gallop  Pulmonary:      Effort: Pulmonary effort is normal  No respiratory distress  Breath sounds: Rales (bibasilar rales  ) present  No wheezing or rhonchi  Abdominal:      General: Bowel sounds are normal  There is distension  Palpations: Abdomen is soft  Tenderness: There is no abdominal tenderness  There is no guarding  Musculoskeletal:      Right lower leg: Edema (2+ up to knee) present  Left lower leg: Edema (2+ up to knee) present  Skin:     General: Skin is warm and dry  Coloration: Skin is pale  Neurological:      Mental Status: She is alert  Comments: CN 2-12 grossly intact, moves all 4 extremities   Psychiatric:         Behavior: Behavior normal        PAST MEDICAL HISTORY   No past medical history on file    PAST SURGICAL HISTORY     Past Surgical History:   Procedure Laterality Date    TONSILLECTOMY       SOCIAL & FAMILY HISTORY     Social History     Substance and Sexual Activity   Alcohol Use None     Substance and Sexual Activity   Alcohol Use None        Substance and Sexual Activity   Drug Use Not on file     Social History     Tobacco Use   Smoking Status Never Smoker   Smokeless Tobacco Never Used     No family history on file   LABORATORY DATA     Labs: I have personally reviewed pertinent reports  Results from last 7 days   Lab Units 05/11/22  1358   WBC Thousand/uL 8 87   HEMOGLOBIN g/dL 3 2*   HEMATOCRIT % 12 7*   PLATELETS Thousands/uL 469*   NEUTROS PCT % 77*   MONOS PCT % 7      Results from last 7 days   Lab Units 05/11/22  1358   POTASSIUM mmol/L 3 4*   CHLORIDE mmol/L 109*   CO2 mmol/L 19*   BUN mg/dL 15   CREATININE mg/dL 0 90   CALCIUM mg/dL 8 7   ALK PHOS U/L 132*   ALT U/L 59   AST U/L 47*                          Micro:  No results found for: BLOODCX, URINECX, WOUNDCULT, SPUTUMCULTUR  IMAGING & DIAGNOSTIC TESTS     Imaging: I have personally reviewed pertinent reports  XR chest 2 views    Result Date: 5/11/2022  Impression: Small bilateral pleural effusions (left worse than right) with bibasilar atelectasis  Workstation performed: [id number]     EKG, Pathology, and Other Studies: I have personally reviewed pertinent reports       ALLERGIES     Allergies   Allergen Reactions    Penicillins Hives    Shellfish-Derived Products - Food Allergy GI Intolerance    Latex Rash     MEDICATIONS PRIOR TO ARRIVAL     Prior to Admission medications    Not on File     MEDICATIONS ADMINISTERED IN LAST 24 HOURS     Medication Administration - last 24 hours from 05/10/2022 1704 to 05/11/2022 1704       Date/Time Order Dose Route Action Action by     05/11/2022 1404 furosemide (LASIX) injection 40 mg 40 mg Intravenous Given Betsy Moore, EDWIN     05/11/2022 1625 senna (SENOKOT) tablet 8 6 mg 8 6 mg Oral Not Given Larna Blood, RN     05/11/2022 1625 polyethylene glycol (MIRALAX) packet 17 g 17 g Oral Not Given Larna Blood, RN     05/11/2022 1654 losartan (COZAAR) tablet 50 mg 50 mg Oral Given Octavio Thompson, EDWIN     05/11/2022 1639 enoxaparin (LOVENOX) subcutaneous injection 40 mg 40 mg Subcutaneous Given Octavio Thompson, EDWIN     05/11/2022 1639 potassium chloride (K-DUR,KLOR-CON) CR tablet 20 mEq 20 mEq Oral Given [facility], RN        CURRENT MEDICATIONS     Current Facility-Administered Medications   Medication Dose Route Frequency Provider Last Rate    enoxaparin  40 mg Subcutaneous Daily Alinda Amarilis, DO      furosemide  40 mg Intravenous BID Alinda Amarilis, DO      hydrALAZINE  5 mg Intravenous Q6H PRN Alinda Amarilis, DO      labetalol  10 mg Intravenous Q4H PRN Alinda Amarilis, DO      losartan  50 mg Oral Daily Alinda Amarilis, DO      metoprolol tartrate  12 5 mg Oral Q12H [address] Alinda Amarilis, DO      nitroglycerin  0 4 mg Sublingual Q5 Min PRN Alinda Amarilis, DO      polyethylene glycol  17 g Oral Daily Alinda Amarilis, DO      potassium chloride  20 mEq Oral Daily Alinda Amarilis, DO      senna  1 tablet Oral Daily Krys Reyna, DO          hydrALAZINE, 5 mg, Q6H PRN  labetalol, 10 mg, Q4H PRN  nitroglycerin, 0 4 mg, Q5 Min PRN        Admission Time  I spent 20 minutes admitting the patient  This involved direct patient contact where I performed a full history and physical, reviewing previous records, and reviewing laboratory and other diagnostic studies  Portions of the record may have been created with voice recognition software  Occasional wrong word or "sound a like" substitutions may have occurred due to the inherent limitations of voice recognition software    Read the chart carefully and recognize, using context, where substitutions have occurred     ==  Andres Mishra DO  [address]  Internal Medicine Residency PGY-1

## 2022-05-11 NOTE — RESPIRATORY THERAPY NOTE
RT Protocol Note  Jillian Mahan 61 y o  female MRN: [id number]  Unit/Bed#: ED 14 Encounter: [id number]    Assessment    Active Problems:    Microcytic anemia    Acute CHF (congestive heart failure) ([facility])      Home Pulmonary Medications:         No past medical history on file  Social History     Socioeconomic History    Marital status: Single     Spouse name: Not on file    Number of children: Not on file    Years of education: Not on file    Highest education level: Not on file   Occupational History    Not on file   Tobacco Use    Smoking status: Never Smoker    Smokeless tobacco: Never Used   Substance and Sexual Activity    Alcohol use: Not on file    Drug use: Not on file    Sexual activity: Not on file   Other Topics Concern    Not on file   Social History Narrative    Not on file     Social Determinants of Health     Financial Resource Strain: Not on file   Food Insecurity: Not on file   Transportation Needs: Unknown    Lack of Transportation (Medical): No    Lack of Transportation (Non-Medical): Not on file   Physical Activity: Not on file   Stress: Not on file   Social Connections: Not on file   Intimate Partner Violence: Not on file   Housing Stability: Low Risk     Unable to Pay for Housing in the Last Year: No    Number of Places Lived in the Last Year: 1    Unstable Housing in the Last Year: No       Subjective         Objective    Physical Exam:   Assessment Type: Assess only  General Appearance: Alert, Awake  Respiratory Pattern: Normal (C/O SOB with exercise)  Chest Assessment: Chest expansion symmetrical  Bilateral Breath Sounds: Diminished  Cough: Non-productive    Vitals:  Blood pressure (!) 173/77, pulse 91, temperature 98 4 °F (36 9 °C), temperature source Oral, resp  rate 18, height 5' 11" (1 803 m), weight 109 kg (240 lb), SpO2 98 %  Imaging and other studies: I have personally reviewed pertinent reports              Plan    Respiratory Plan: No distress/Pulmonary history Resp Comments: Pt assessed per resp protocol  Pt has Anemia and CHF  Per resp protocol will order Albuterol MDI q6 prn for wheezing

## 2022-05-12 ENCOUNTER — APPOINTMENT (INPATIENT)
Dept: RADIOLOGY | Facility: HOSPITAL | Age: 67
DRG: 663 | End: 2022-05-12
Payer: COMMERCIAL

## 2022-05-12 PROBLEM — I50.83 HIGH OUTPUT HEART FAILURE (HCC): Status: ACTIVE | Noted: 2022-05-11

## 2022-05-12 PROBLEM — I10 HYPERTENSION: Status: ACTIVE | Noted: 2022-05-12

## 2022-05-12 LAB
ABO GROUP BLD BPU: NORMAL
ABO GROUP BLD BPU: NORMAL
ALBUMIN SERPL BCP-MCNC: 2.9 G/DL (ref 3.5–5)
ALP SERPL-CCNC: 124 U/L (ref 46–116)
ALT SERPL W P-5'-P-CCNC: 55 U/L (ref 12–78)
ANION GAP SERPL CALCULATED.3IONS-SCNC: 7 MMOL/L (ref 4–13)
ANION GAP SERPL CALCULATED.3IONS-SCNC: 8 MMOL/L (ref 4–13)
ANISOCYTOSIS BLD QL SMEAR: PRESENT
AST SERPL W P-5'-P-CCNC: 41 U/L (ref 5–45)
BASOPHILS # BLD AUTO: 0.08 THOUSANDS/ΜL (ref 0–0.1)
BASOPHILS NFR BLD AUTO: 1 % (ref 0–1)
BASOPHILS NFR BLD MANUAL: 1 % (ref 0–1)
BILIRUB SERPL-MCNC: 0.81 MG/DL (ref 0.2–1)
BPU ID: NORMAL
BPU ID: NORMAL
BUN SERPL-MCNC: 14 MG/DL (ref 5–25)
BUN SERPL-MCNC: 14 MG/DL (ref 5–25)
CALCIUM ALBUM COR SERPL-MCNC: 9.4 MG/DL (ref 8.3–10.1)
CALCIUM SERPL-MCNC: 8.5 MG/DL (ref 8.3–10.1)
CALCIUM SERPL-MCNC: 8.7 MG/DL (ref 8.3–10.1)
CHLORIDE SERPL-SCNC: 107 MMOL/L (ref 100–108)
CHLORIDE SERPL-SCNC: 108 MMOL/L (ref 100–108)
CHOLEST SERPL-MCNC: 87 MG/DL
CO2 SERPL-SCNC: 24 MMOL/L (ref 21–32)
CO2 SERPL-SCNC: 25 MMOL/L (ref 21–32)
CREAT SERPL-MCNC: 0.8 MG/DL (ref 0.6–1.3)
CREAT SERPL-MCNC: 1.01 MG/DL (ref 0.6–1.3)
CROSSMATCH: NORMAL
CROSSMATCH: NORMAL
EOSINOPHIL # BLD AUTO: 0.09 THOUSAND/ΜL (ref 0–0.61)
EOSINOPHIL NFR BLD AUTO: 1 % (ref 0–6)
ERYTHROCYTE [DISTWIDTH] IN BLOOD BY AUTOMATED COUNT: 30.5 % (ref 11.6–15.1)
FOLATE SERPL-MCNC: >20 NG/ML (ref 3.1–17.5)
GFR SERPL CREATININE-BSD FRML MDRD: 58 ML/MIN/1.73SQ M
GFR SERPL CREATININE-BSD FRML MDRD: 78 ML/MIN/1.73SQ M
GLUCOSE SERPL-MCNC: 107 MG/DL (ref 65–140)
GLUCOSE SERPL-MCNC: 130 MG/DL (ref 65–140)
HCT VFR BLD AUTO: 15.5 % (ref 34.8–46.1)
HCT VFR BLD AUTO: 19.4 % (ref 34.8–46.1)
HCT VFR BLD AUTO: 22.6 % (ref 34.8–46.1)
HCT VFR BLD AUTO: 28.7 % (ref 34.8–46.1)
HDLC SERPL-MCNC: 37 MG/DL
HGB BLD-MCNC: 4.1 G/DL (ref 11.5–15.4)
HGB BLD-MCNC: 5.4 G/DL (ref 11.5–15.4)
HGB BLD-MCNC: 6.5 G/DL (ref 11.5–15.4)
HGB BLD-MCNC: 8.8 G/DL (ref 11.5–15.4)
HYPERCHROMIA BLD QL SMEAR: PRESENT
IMM EOSINOPHIL NFR BLD MANUAL: 1 % (ref 0–6)
IMM GRANULOCYTES # BLD AUTO: 0.1 THOUSAND/UL (ref 0–0.2)
IMM GRANULOCYTES NFR BLD AUTO: 1 % (ref 0–2)
LDLC SERPL CALC-MCNC: 38 MG/DL (ref 0–100)
LYMPHOCYTES # BLD AUTO: 1.74 THOUSANDS/ΜL (ref 0.6–4.47)
LYMPHOCYTES NFR BLD AUTO: 21 % (ref 14–44)
LYMPHOCYTES NFR BLD: 24 % (ref 14–44)
MCH RBC QN AUTO: 19.9 PG (ref 26.8–34.3)
MCHC RBC AUTO-ENTMCNC: 28.8 G/DL (ref 31.4–37.4)
MCV RBC AUTO: 69 FL (ref 82–98)
MICROCYTES BLD QL AUTO: PRESENT
MONOCYTES # BLD AUTO: 0.83 THOUSAND/ΜL (ref 0.17–1.22)
MONOCYTES NFR BLD AUTO: 10 % (ref 4–12)
MONOCYTES NFR BLD AUTO: 5 % (ref 4–12)
NEUTROPHILS # BLD AUTO: 5.66 THOUSANDS/ΜL (ref 1.85–7.62)
NEUTS SEG NFR BLD AUTO: 66 % (ref 43–75)
NEUTS SEG NFR BLD AUTO: 67 % (ref 45–77)
NRBC BLD AUTO-RTO: 15 /100 WBC (ref 0–2)
NRBC BLD AUTO-RTO: 5 /100 WBCS
OVALOCYTES BLD QL SMEAR: PRESENT
PATHOLOGIST INTERPRETATION: NORMAL
PATHOLOGY REVIEW: YES
PLATELET # BLD AUTO: 391 THOUSANDS/UL (ref 149–390)
PLATELET # BLD AUTO: 444 THOUSANDS/UL (ref 149–390)
PMV BLD AUTO: 11.4 FL (ref 8.9–12.7)
PMV BLD AUTO: 11.5 FL (ref 8.9–12.7)
POLYCHROMASIA BLD QL SMEAR: PRESENT
POTASSIUM SERPL-SCNC: 3.2 MMOL/L (ref 3.5–5.3)
POTASSIUM SERPL-SCNC: 3.9 MMOL/L (ref 3.5–5.3)
PROT SERPL-MCNC: 6.2 G/DL (ref 6.4–8.2)
RBC # BLD AUTO: 3.26 MILLION/UL (ref 3.81–5.12)
SCHISTOCYTES BLD QL SMEAR: PRESENT
SODIUM SERPL-SCNC: 139 MMOL/L (ref 136–145)
SODIUM SERPL-SCNC: 140 MMOL/L (ref 136–145)
TARGETS BLD QL SMEAR: PRESENT
TOTAL CELLS COUNTED SPEC: 100
TRIGL SERPL-MCNC: 58 MG/DL
TSH SERPL DL<=0.05 MIU/L-ACNC: 1.1 UIU/ML (ref 0.45–4.5)
UNIT DISPENSE STATUS: NORMAL
UNIT DISPENSE STATUS: NORMAL
UNIT PRODUCT CODE: NORMAL
UNIT PRODUCT CODE: NORMAL
UNIT PRODUCT VOLUME: 350 ML
UNIT PRODUCT VOLUME: 350 ML
UNIT RH: NORMAL
UNIT RH: NORMAL
VARIANT LYMPHS BLD QL SMEAR: 2 % (ref 0–0)
VIT B12 SERPL-MCNC: 652 PG/ML (ref 100–900)
WBC # BLD AUTO: 8.5 THOUSAND/UL (ref 4.31–10.16)

## 2022-05-12 PROCEDURE — P9016 RBC LEUKOCYTES REDUCED: HCPCS

## 2022-05-12 PROCEDURE — 74176 CT ABD & PELVIS W/O CONTRAST: CPT

## 2022-05-12 PROCEDURE — 82746 ASSAY OF FOLIC ACID SERUM: CPT | Performed by: STUDENT IN AN ORGANIZED HEALTH CARE EDUCATION/TRAINING PROGRAM

## 2022-05-12 PROCEDURE — 85007 BL SMEAR W/DIFF WBC COUNT: CPT | Performed by: STUDENT IN AN ORGANIZED HEALTH CARE EDUCATION/TRAINING PROGRAM

## 2022-05-12 PROCEDURE — 30233N1 TRANSFUSION OF NONAUTOLOGOUS RED BLOOD CELLS INTO PERIPHERAL VEIN, PERCUTANEOUS APPROACH: ICD-10-PCS | Performed by: STUDENT IN AN ORGANIZED HEALTH CARE EDUCATION/TRAINING PROGRAM

## 2022-05-12 PROCEDURE — 82607 VITAMIN B-12: CPT | Performed by: STUDENT IN AN ORGANIZED HEALTH CARE EDUCATION/TRAINING PROGRAM

## 2022-05-12 PROCEDURE — 85014 HEMATOCRIT: CPT | Performed by: STUDENT IN AN ORGANIZED HEALTH CARE EDUCATION/TRAINING PROGRAM

## 2022-05-12 PROCEDURE — 99222 1ST HOSP IP/OBS MODERATE 55: CPT | Performed by: INTERNAL MEDICINE

## 2022-05-12 PROCEDURE — 83010 ASSAY OF HAPTOGLOBIN QUANT: CPT | Performed by: STUDENT IN AN ORGANIZED HEALTH CARE EDUCATION/TRAINING PROGRAM

## 2022-05-12 PROCEDURE — NC001 PR NO CHARGE: Performed by: INTERNAL MEDICINE

## 2022-05-12 PROCEDURE — 85025 COMPLETE CBC W/AUTO DIFF WBC: CPT | Performed by: STUDENT IN AN ORGANIZED HEALTH CARE EDUCATION/TRAINING PROGRAM

## 2022-05-12 PROCEDURE — 83036 HEMOGLOBIN GLYCOSYLATED A1C: CPT | Performed by: STUDENT IN AN ORGANIZED HEALTH CARE EDUCATION/TRAINING PROGRAM

## 2022-05-12 PROCEDURE — 84443 ASSAY THYROID STIM HORMONE: CPT | Performed by: STUDENT IN AN ORGANIZED HEALTH CARE EDUCATION/TRAINING PROGRAM

## 2022-05-12 PROCEDURE — G1004 CDSM NDSC: HCPCS

## 2022-05-12 PROCEDURE — 80061 LIPID PANEL: CPT | Performed by: STUDENT IN AN ORGANIZED HEALTH CARE EDUCATION/TRAINING PROGRAM

## 2022-05-12 PROCEDURE — 99255 IP/OBS CONSLTJ NEW/EST HI 80: CPT | Performed by: INTERNAL MEDICINE

## 2022-05-12 PROCEDURE — 85018 HEMOGLOBIN: CPT | Performed by: STUDENT IN AN ORGANIZED HEALTH CARE EDUCATION/TRAINING PROGRAM

## 2022-05-12 PROCEDURE — 80048 BASIC METABOLIC PNL TOTAL CA: CPT | Performed by: STUDENT IN AN ORGANIZED HEALTH CARE EDUCATION/TRAINING PROGRAM

## 2022-05-12 RX ORDER — MAGNESIUM CARB/ALUMINUM HYDROX 105-160MG
296 TABLET,CHEWABLE ORAL ONCE
Status: COMPLETED | OUTPATIENT
Start: 2022-05-13 | End: 2022-05-13

## 2022-05-12 RX ORDER — MAGNESIUM CARB/ALUMINUM HYDROX 105-160MG
296 TABLET,CHEWABLE ORAL ONCE
Status: DISCONTINUED | OUTPATIENT
Start: 2022-05-12 | End: 2022-05-12

## 2022-05-12 RX ORDER — MAGNESIUM CARB/ALUMINUM HYDROX 105-160MG
296 TABLET,CHEWABLE ORAL ONCE
Status: COMPLETED | OUTPATIENT
Start: 2022-05-12 | End: 2022-05-12

## 2022-05-12 RX ORDER — PANTOPRAZOLE SODIUM 40 MG/1
40 TABLET, DELAYED RELEASE ORAL
Status: DISCONTINUED | OUTPATIENT
Start: 2022-05-13 | End: 2022-05-14 | Stop reason: HOSPADM

## 2022-05-12 RX ORDER — FUROSEMIDE 10 MG/ML
40 INJECTION INTRAMUSCULAR; INTRAVENOUS ONCE
Status: COMPLETED | OUTPATIENT
Start: 2022-05-12 | End: 2022-05-12

## 2022-05-12 RX ORDER — ONDANSETRON 2 MG/ML
4 INJECTION INTRAMUSCULAR; INTRAVENOUS ONCE
Status: COMPLETED | OUTPATIENT
Start: 2022-05-12 | End: 2022-05-12

## 2022-05-12 RX ORDER — POTASSIUM CHLORIDE 20 MEQ/1
40 TABLET, EXTENDED RELEASE ORAL
Status: COMPLETED | OUTPATIENT
Start: 2022-05-12 | End: 2022-05-12

## 2022-05-12 RX ADMIN — POTASSIUM CHLORIDE 40 MEQ: 1500 TABLET, EXTENDED RELEASE ORAL at 01:19

## 2022-05-12 RX ADMIN — ONDANSETRON 4 MG: 2 INJECTION INTRAMUSCULAR; INTRAVENOUS at 02:33

## 2022-05-12 RX ADMIN — ENOXAPARIN SODIUM 40 MG: 40 INJECTION SUBCUTANEOUS at 09:07

## 2022-05-12 RX ADMIN — FUROSEMIDE 40 MG: 10 INJECTION, SOLUTION INTRAMUSCULAR; INTRAVENOUS at 17:57

## 2022-05-12 RX ADMIN — FUROSEMIDE 40 MG: 10 INJECTION, SOLUTION INTRAMUSCULAR; INTRAVENOUS at 06:14

## 2022-05-12 RX ADMIN — LOSARTAN POTASSIUM 50 MG: 50 TABLET, FILM COATED ORAL at 09:06

## 2022-05-12 RX ADMIN — MAGNESIUM CITRATE 296 ML: 1.75 LIQUID ORAL at 11:57

## 2022-05-12 RX ADMIN — BISACODYL 20 MG: 5 TABLET, COATED ORAL at 17:57

## 2022-05-12 RX ADMIN — POLYETHYLENE GLYCOL 3350, SODIUM SULFATE ANHYDROUS, SODIUM BICARBONATE, SODIUM CHLORIDE, POTASSIUM CHLORIDE 4000 ML: 236; 22.74; 6.74; 5.86; 2.97 POWDER, FOR SOLUTION ORAL at 22:10

## 2022-05-12 RX ADMIN — FUROSEMIDE 40 MG: 10 INJECTION, SOLUTION INTRAMUSCULAR; INTRAVENOUS at 09:06

## 2022-05-12 RX ADMIN — POTASSIUM CHLORIDE 20 MEQ: 1500 TABLET, EXTENDED RELEASE ORAL at 09:07

## 2022-05-12 RX ADMIN — ALBUTEROL SULFATE 2 PUFF: 90 AEROSOL, METERED RESPIRATORY (INHALATION) at 02:18

## 2022-05-12 RX ADMIN — POTASSIUM CHLORIDE 40 MEQ: 1500 TABLET, EXTENDED RELEASE ORAL at 03:11

## 2022-05-12 NOTE — PROGRESS NOTES
INTERNAL MEDICINE RESIDENCY PROGRESS NOTE     Name: Tyrel Whitaker   Age & Sex: 61 y o  female   MRN: [id number]  Unit/Bed#: -01   Encounter: [id number]  Team: SOD Team C     PATIENT INFORMATION     Name: Tyrel Whitaker   Age & Sex: 61 y o  female   MRN: [id number]  Hospital Stay Days: 1    ASSESSMENT/PLAN     Principal Problem:    FALLON (dyspnea on exertion)  Active Problems:    Microcytic anemia    High output heart failure ([address] )    Hypertension      Hypertension  Assessment & Plan  Initially presenting with blood pressure of 187/81  Blood pressure remains persistently elevated to 160s over 80s  Patient previously on antihypertensives  Plan  -start losartan 50 mg daily  -can consider adding thiazide diuretic if blood pressure cannot be controlled on losartan alone  - p r n  Hydralazine and labetalol    High output heart failure (HCC)  Assessment & Plan  Wt Readings from Last 3 Encounters:   05/11/22 109 kg (240 lb)     Presents dyspnea on exertion, bilateral leg and abdominal swelling, PND, oliguria for the past 2 weeks  Patient has increased her fluid intake due to a dry cough  Hypertensive (177/77) and tachycardic (113) on admission, saturating adequately on room air  Bilateral 2+ pitting leg edema, JVD  In the setting of severe anemia with hemoglobin 3 2, , troponin 10  PA and lateral chest x-ray shows small bilateral pleural effusions left worse than right with bibasilar atelectasis  Echocardiogram demonstrates no heart failure, LVEF 65%, hyperdynamic  TSH within normal limits  Patient responding appropriately Lasix, lower extremity edema improving  Plan  · IV Lasix 40 mg b i d  with KDur 20mEq daily  · Discontinue metoprolol given no heart failure on echo  · Strict I/O, daily weights  · Monitor on telemetry  · P T /OT, Nutrition, and Case Management consults  · Check lipid panel hemoglobin A1c      Microcytic anemia  Assessment & Plan  Hemoglobin 3 2 and MCV 58  Denies hematuria, hematochezia, NSAID use, hematemesis, cancer history  Never had colonoscopy  Does endorse single episode of black stool several weeks ago, has not recurred  Denies any history of abnormal vaginal bleeding  Patient does endorse decades long history of anemia, although she denies having symptoms until recently  She does not know what her baseline hemoglobin is  Patient denies any family history of anemia  Family is of [place]  descent  Differential diagnosis includes GI bleed versus thalassemia versus hemolytic disease  Plan  · Status post 4 units PRBC with appropriate response  · Hemoglobin improved to 6 5 after 3 units  · H&H q 12 hours  · No bleed seen on CT  · GI consulted, appreciate recs  · Clear liquid diet today, planning for EGD and scope tomorrow  · Transfuse if Hg < 7 or if symptomatic  · F/u iron panel, haptoglobin, LDH  · Likely plan to give Venofer tomorrow    * FALLON (dyspnea on exertion)  Assessment & Plan  In the setting of hemoglobin 3 2  Small bilateral pleural effusions but no vascular congestion or airspace opacities on chest x-ray  Likely secondary to symptomatic anemia      Disposition: Bowel prep today for colonoscopy and EGD tomorrow to investigate for source of her anemia  SUBJECTIVE     Patient seen and examined  She is in no acute distress  Breathlessness and fatigue is no longer present and leg swelling has decreased, but still present  Patient denies fevers, chills, nausea, vomiting, diarrhea, constipation, chest pain, palpitations, shortness of breath, cough, abdominal pain, dysuria, congestion, sore throat, headaches, or myalgias      OBJECTIVE     Vitals:    05/12/22 0750 05/12/22 0811 05/12/22 0833 05/12/22 0842   BP: 164/89 162/89 161/88 161/88   Pulse: 78 79 74 80   Resp: 12  12    Temp: 98 2 °F (36 8 °C) 98 2 °F (36 8 °C) 98 2 °F (36 8 °C) 98 2 °F (36 8 °C)   TempSrc:       SpO2:  97% 97% 97%   Weight:       Height:          Temperature:   Temp (24hrs), Av 4 °F (36 9 °C), Min:98 °F (36 7 °C), Max:99 8 °F (37 7 °C)    Temperature: 98 2 °F (36 8 °C)  Intake & Output:  I/O       05/10 0701   0700  0701   0700  07 0700    P  O   200     Blood  1050     Total Intake(mL/kg)  1250 (11)     Urine (mL/kg/hr)  3650     Total Output  3650     Net  -2400            Unmeasured Urine Occurrence  2 x         Weights:        Body mass index is 35 04 kg/m²  Weight (last 2 days)     Date/Time Weight    22 0538 114 (251 2)    22 1722 109 (240)        Physical Exam  Constitutional:       Appearance: Normal appearance  She is obese  HENT:      Head: Normocephalic and atraumatic  Nose: Nose normal       Mouth/Throat:      Mouth: Mucous membranes are moist       Pharynx: Oropharynx is clear  Eyes:      Conjunctiva/sclera: Conjunctivae normal    Cardiovascular:      Rate and Rhythm: Normal rate and regular rhythm  Pulses: Normal pulses  Heart sounds: Normal heart sounds  Pulmonary:      Effort: Pulmonary effort is normal       Breath sounds: Normal breath sounds  Abdominal:      Palpations: Abdomen is soft  Tenderness: There is no abdominal tenderness  There is no guarding  Musculoskeletal:      Right lower leg: 3+ Edema (Extends into upper leg) present  Left lower leg: 3+ Edema (Extends into upper leg) present  Skin:     General: Skin is warm and dry  Capillary Refill: Capillary refill takes 2 to 3 seconds  Neurological:      General: No focal deficit present  Mental Status: She is alert and oriented to person, place, and time  Psychiatric:         Mood and Affect: Mood normal          Behavior: Behavior normal        LABORATORY DATA     Labs: I have personally reviewed pertinent reports    Results from last 7 days   Lab Units 22  0524 22  0254 22  2305 22  2222 22  1358   WBC Thousand/uL 8 50  --   --   --  8 87   HEMOGLOBIN g/dL 6 5* 5 4* 4 1*   < > 3 2* HEMATOCRIT % 22 6* 19 4* 15 5*   < > 12 7*   PLATELETS Thousands/uL 391*  --  444*  --  469*   NEUTROS PCT % 66  --   --   --  77*   MONOS PCT % 10  --   --   --  7    < > = values in this interval not displayed  Results from last 7 days   Lab Units 05/12/22  0524 05/11/22  2305 05/11/22  1358   POTASSIUM mmol/L 3 9 3 2* 3 4*   CHLORIDE mmol/L 108 107 109*   CO2 mmol/L 24 25 19*   BUN mg/dL 14 14 15   CREATININE mg/dL 0 80 1 01 0 90   CALCIUM mg/dL 8 7 8 5 8 7   ALK PHOS U/L  --  124* 132*   ALT U/L  --  55 59   AST U/L  --  41 47*                            IMAGING & DIAGNOSTIC TESTING     Radiology Results: I have personally reviewed pertinent reports  CT abdomen pelvis wo contrast    Result Date: 5/12/2022  Impression: No acute intra-abdominal pathology to account for anemia  Specifically, no intraperitoneal or retroperitoneal hematoma  Hiatal hernia  The entire gastric fundus is contained within the hernia sac and the gastric wall is somewhat thickened  Trace left greater than right pleural effusions  Mild body wall edema  Cholelithiasis  Few scattered colonic diverticula  Workstation performed: [id number]     XR chest 2 views    Result Date: 5/11/2022  Impression: Small bilateral pleural effusions (left worse than right) with bibasilar atelectasis  Workstation performed: [id number]     Other Diagnostic Testing: I have personally reviewed pertinent reports      ACTIVE MEDICATIONS     Current Facility-Administered Medications   Medication Dose Route Frequency    albuterol (PROVENTIL HFA,VENTOLIN HFA) inhaler 2 puff  2 puff Inhalation Q4H PRN    enoxaparin (LOVENOX) subcutaneous injection 40 mg  40 mg Subcutaneous Daily    furosemide (LASIX) injection 40 mg  40 mg Intravenous BID    hydrALAZINE (APRESOLINE) injection 5 mg  5 mg Intravenous Q6H PRN    labetalol (NORMODYNE) injection 10 mg  10 mg Intravenous Q4H PRN    losartan (COZAAR) tablet 50 mg  50 mg Oral Daily    nitroglycerin (NITROSTAT) SL tablet 0 4 mg  0 4 mg Sublingual Q5 Min PRN    polyethylene glycol (MIRALAX) packet 17 g  17 g Oral Daily    potassium chloride (K-DUR,KLOR-CON) CR tablet 20 mEq  20 mEq Oral Daily    senna (SENOKOT) tablet 8 6 mg  1 tablet Oral Daily       VTE Pharmacologic Prophylaxis: Enoxaparin (Lovenox)  VTE Mechanical Prophylaxis:  SCDs    Portions of the record may have been created with voice recognition software  Occasional wrong word or "sound a like" substitutions may have occurred due to the inherent limitations of voice recognition software    Read the chart carefully and recognize, using context, where substitutions have occurred   ==  Kavon Parks MD  [address]  Internal Medicine Residency PGY-1

## 2022-05-12 NOTE — DISCHARGE INSTR - OTHER ORDERS
Skin Care orders:  1-Hydraguard to sacrum, buttocks and heels 2 times per day   2-EHOB  cushion when out of bed in chair  3-Moisturize skin daily with skin nourishing cream  4-Elevate heels to offload pressure  5-Turn/reposition q2h for pressure re-distribution on skin  6   Right and left lower leg - blistered areas apply 3 M no sting daily to the areas

## 2022-05-12 NOTE — PHYSICAL THERAPY NOTE
Physical Therapy Screen    Patient's Name: Zachariah Woodward [facility]       05/12/22 1442   PT Last Visit   PT Visit Date 05/12/22   Note Type   Note type Screen   Additional Comments PT orders received  Chart reviewed  Pt I w/ bed mobility, t/f (including toilet), and ambulation 100' w/o AD  No skilled acute PT needs at this time  Will sign off  Pt encouraged to mobilize independently while in-house to prevent complications of immobility         Bridget Hernández, PT, DPT

## 2022-05-12 NOTE — ED PROCEDURE NOTE
PROCEDURE  CriticalCare Time  Performed by: Sravanthi Dow DO  Authorized by:  Sravanthi Dow DO     Critical care provider statement:     Critical care time (minutes):  32    Critical care time was exclusive of:  Separately billable procedures and treating other patients and teaching time    Critical care was necessary to treat or prevent imminent or life-threatening deterioration of the following conditions:  Circulatory failure (significant anemia reqiuring blood transfusion)    Critical care was time spent personally by me on the following activities:  Blood draw for specimens, obtaining history from patient or surrogate, development of treatment plan with patient or surrogate, evaluation of patient's response to treatment, examination of patient, re-evaluation of patient's condition, ordering and review of radiographic studies, ordering and review of laboratory studies and ordering and performing treatments and interventions    I assumed direction of critical care for this patient from another provider in my specialty: no           Clearance DO Victor M  05/12/22 5520

## 2022-05-12 NOTE — PLAN OF CARE
Problem: MOBILITY - ADULT  Goal: Maintain or return to baseline ADL function  Description: INTERVENTIONS:  -  Assess patient's ability to carry out ADLs; assess patient's baseline for ADL function and identify physical deficits which impact ability to perform ADLs (bathing, care of mouth/teeth, toileting, grooming, dressing, etc )  - Assess/evaluate cause of self-care deficits   - Assess range of motion  - Assess patient's mobility; develop plan if impaired  - Assess patient's need for assistive devices and provide as appropriate  - Encourage maximum independence but intervene and supervise when necessary  - Involve family in performance of ADLs  - Assess for home care needs following discharge   - Consider OT consult to assist with ADL evaluation and planning for discharge  - Provide patient education as appropriate  Outcome: Progressing  Goal: Maintains/Returns to pre admission functional level  Description: INTERVENTIONS:  - Perform BMAT or MOVE assessment daily    - Set and communicate daily mobility goal to care team and patient/family/caregiver  - Collaborate with rehabilitation services on mobility goals if consulted  - Perform Range of Motion  times a day  - Reposition patient every  hours    - Dangle patient times a day  - Stand patient  times a day  - Ambulate patient times a day  - Out of bed to chair  times a day   - Out of bed for meals times a day  - Out of bed for toileting  - Record patient progress and toleration of activity level   Outcome: Progressing     Problem: PAIN - ADULT  Goal: Verbalizes/displays adequate comfort level or baseline comfort level  Description: Interventions:  - Encourage patient to monitor pain and request assistance  - Assess pain using appropriate pain scale  - Administer analgesics based on type and severity of pain and evaluate response  - Implement non-pharmacological measures as appropriate and evaluate response  - Consider cultural and social influences on pain and pain management  - Notify physician/advanced practitioner if interventions unsuccessful or patient reports new pain  Outcome: Progressing     Problem: INFECTION - ADULT  Goal: Absence or prevention of progression during hospitalization  Description: INTERVENTIONS:  - Assess and monitor for signs and symptoms of infection  - Monitor lab/diagnostic results  - Monitor all insertion sites, i e  indwelling lines, tubes, and drains  - Monitor endotracheal if appropriate and nasal secretions for changes in amount and color  - [place] appropriate cooling/warming therapies per order  - Administer medications as ordered  - Instruct and encourage patient and family to use good hand hygiene technique  - Identify and instruct in appropriate isolation precautions for identified infection/condition  Outcome: Progressing  Goal: Absence of fever/infection during neutropenic period  Description: INTERVENTIONS:  - Monitor WBC    Outcome: Progressing     Problem: SAFETY ADULT  Goal: Maintain or return to baseline ADL function  Description: INTERVENTIONS:  -  Assess patient's ability to carry out ADLs; assess patient's baseline for ADL function and identify physical deficits which impact ability to perform ADLs (bathing, care of mouth/teeth, toileting, grooming, dressing, etc )  - Assess/evaluate cause of self-care deficits   - Assess range of motion  - Assess patient's mobility; develop plan if impaired  - Assess patient's need for assistive devices and provide as appropriate  - Encourage maximum independence but intervene and supervise when necessary  - Involve family in performance of ADLs  - Assess for home care needs following discharge   - Consider OT consult to assist with ADL evaluation and planning for discharge  - Provide patient education as appropriate  Outcome: Progressing  Goal: Maintains/Returns to pre admission functional level  Description: INTERVENTIONS:  - Perform BMAT or MOVE assessment daily    - Set and communicate daily mobility goal to care team and patient/family/caregiver  - Collaborate with rehabilitation services on mobility goals if consulted  - Perform Range of Motion times a day  - Reposition patient every  hours    - Dangle patient times a day  - Stand patient  times a day  - Ambulate patient times a day  - Out of bed to chair  times a day   - Out of bed for meals times a day  - Out of bed for toileting  - Record patient progress and toleration of activity level   Outcome: Progressing  Goal: Patient will remain free of falls  Description: INTERVENTIONS:  - Educate patient/family on patient safety including physical limitations  - Instruct patient to call for assistance with activity   - Consult OT/PT to assist with strengthening/mobility   - Keep Call bell within reach  - Keep bed low and locked with side rails adjusted as appropriate  - Keep care items and personal belongings within reach  - Initiate and maintain comfort rounds  - Make Fall Risk Sign visible to staff  - Offer Toileting every  Hours, in advance of need  - Initiate/Maintainalarm  - Obtain necessary fall risk management equipment  - Apply yellow socks and bracelet for high fall risk patients  - Consider moving patient to room near nurses station  Outcome: Progressing     Problem: DISCHARGE PLANNING  Goal: Discharge to home or other facility with appropriate resources  Description: INTERVENTIONS:  - Identify barriers to discharge w/patient and caregiver  - Arrange for needed discharge resources and transportation as appropriate  - Identify discharge learning needs (meds, wound care, etc )  - Arrange for interpretive services to assist at discharge as needed  - Refer to Case Management Department for coordinating discharge planning if the patient needs post-hospital services based on physician/advanced practitioner order or complex needs related to functional status, cognitive ability, or social support system  Outcome: Progressing Problem: Knowledge Deficit  Goal: Patient/family/caregiver demonstrates understanding of disease process, treatment plan, medications, and discharge instructions  Description: Complete learning assessment and assess knowledge base    Interventions:  - Provide teaching at level of understanding  - Provide teaching via preferred learning methods  Outcome: Progressing

## 2022-05-12 NOTE — UTILIZATION REVIEW
Initial Clinical Review    Admission: Date/Time/Statement:   Admission Orders (From admission, onward)     Ordered        05/11/22 1504  Inpatient Admission  Once                      Orders Placed This Encounter   Procedures    Inpatient Admission     Standing Status:   Standing     Number of Occurrences:   1     Order Specific Question:   Level of Care     Answer:   Med Surg [16]     Order Specific Question:   Estimated length of stay     Answer:   More than 2 Midnights     Order Specific Question:   Certification     Answer:   I certify that inpatient services are medically necessary for this patient for a duration of greater than two midnights  See H&P and MD Progress Notes for additional information about the patient's course of treatment  ED Arrival Information     Expected   -    Arrival   5/11/2022 13:35    Acuity   Emergent            Means of arrival   Ambulance    Escorted by   [id number] [facility]    Service   [facility]    Admission type   Emergency            Arrival complaint   -           Chief Complaint   Patient presents with    Shortness of Breath     PT reports increased SOB and leg swelling for the past few weeks  Initial Presentation: 61 y o  female from home presented to the ED with worsening FALLON and LE swelling  No known PMH  Pt reports dry cough and mild breathlessness started last December after receiving Moderna booster and symptoms persisted since  Symptoms became severe the last 2 weeks  She is only able to walk 20 feet before stopping to rest and FALLON is associated with b/l shoulder pain  She gained 20 lbs in her legs and abdomen  D/t symptoms, unable to cook healthy meals and her mother has been cooking and has not had a healthy diet  In the Ed, /77,   CBC remarkable for hemoglobin 3 2, hematocrit 12 7, and CV 58, platelets 523  CMP remarkable for potassium 3 4  CXR showed small bilateral pleural effusions left worse than right with bibasilar atelectasis    On exam, pt is alert, Obese, +JVD, tachycardic, bibasilar rales heard, +2 b/l LE present  She received IV Lasix 40 mg x 1 with 1100 cc urine output after  2 U PRBC ordered  Plan: Inpatient admission for evaluation and treatment of acute CHF, microcytic anemia:  IV Lasix 40 mg b i d  Start Losartan 50 mg, metoprolol tartrate 12 5 mg b i d  K-Dur 20 mg  PRN hydralazine 5 mg q 6 hrs PRN for SBP > 170, labetalol 10 mg q 4 hrs for SBP > 180  Echo ordered  Check TSH  F/u repeat troponin  Strict I/O, daily weights  Cardiac diet, 1 5 L fluid restriction  Monitor on telemetry   Cardiology, heart failure consulted     ED Triage Vitals   Temperature Pulse Respirations Blood Pressure SpO2   05/11/22 1339 05/11/22 1339 05/11/22 1339 05/11/22 1340 05/11/22 1339   98 1 °F (36 7 °C) (!) 113 22 (!) 177/77 100 %      Temp Source Heart Rate Source Patient Position - Orthostatic VS BP Location FiO2 (%)   05/11/22 1339 05/11/22 1339 05/11/22 1339 05/11/22 1339 --   Oral Monitor Lying Right arm       Pain Score       05/11/22 1339       No Pain          Wt Readings from Last 1 Encounters:   05/12/22 114 kg (251 lb 3 2 oz)     Additional Vital Signs:   Date/Time Temp Pulse Resp BP MAP (mmHg) SpO2 O2 Device Patient Position - Orthostatic VS   05/12/22 1225 99 7 °F (37 6 °C) 74 12 139/86 -- -- -- --   05/12/22 12:24:34 99 7 °F (37 6 °C) 74 -- 139/86 104 97 % -- --   05/12/22 0905 -- 73 18 -- -- 95 % -- --   05/12/22 08:42:29 98 2 °F (36 8 °C) 80 -- 161/88 112 97 % -- --   05/12/22 0833 98 2 °F (36 8 °C) 74 12 161/88 -- 97 % None (Room air) --   05/12/22 08:11:26 98 2 °F (36 8 °C) 79 -- 162/89 113 97 % -- --   05/12/22 0750 98 2 °F (36 8 °C) 78 12 164/89 -- -- -- --   05/12/22 07:48:47 98 2 °F (36 8 °C) 76 -- 164/89 114 97 % -- --   05/12/22 07:48:34 98 2 °F (36 8 °C) 81 10 Abnormal  164/89 114 97 % -- --   05/12/22 05:05:10 98 4 °F (36 9 °C) 83 18 168/91 117 97 % -- --   05/12/22 0405 98 2 °F (36 8 °C) 77 18 161/81 -- -- -- -- 05/12/22 0335 98 3 °F (36 8 °C) 78 18 168/79 -- -- -- --   05/12/22 0330 -- -- 18 -- -- -- -- --   05/12/22 03:15:30 98 1 °F (36 7 °C) 82 18 167/84 112 96 % -- --   05/12/22 03:10:19 98 2 °F (36 8 °C) 74 -- 169/88 115 96 % -- --   05/12/22 0310 -- -- 18 -- -- -- -- --   05/12/22 03:07:03 98 6 °F (37 °C) 74 -- 168/89 115 97 % -- --   05/12/22 0305 98 6 °F (37 °C) 76 18 168/89 -- -- -- --   05/12/22 02:46:38 98 6 °F (37 °C) 74 19 169/90 116 98 % -- --   05/12/22 02:23:06 98 1 °F (36 7 °C) 79 -- 172/85 Abnormal  114 97 % -- --   05/12/22 0221 98 1 °F (36 7 °C) -- 20 172/85 Abnormal  -- -- -- --   05/12/22 01:21:09 98 8 °F (37 1 °C) 81 -- 132/95 107 97 % -- --   05/12/22 0051 -- -- 18 -- -- -- -- --   05/12/22 00:50:53 98 7 °F (37 1 °C) 78 -- 124/68 87 96 % -- --   05/12/22 00:36:06 99 °F (37 2 °C) 84 -- 120/70 87 97 % -- --   05/12/22 0036 -- -- 18 -- -- -- -- --   05/12/22 00:26:09 98 8 °F (37 1 °C) 78 -- 118/72 87 99 % -- --   05/12/22 00:20:42 98 9 °F (37 2 °C) 75 -- 112/71 85 98 % -- --   05/12/22 0008 -- -- 18 -- -- -- -- --   05/11/22 23:56:22 98 1 °F (36 7 °C) 88 -- 113/68 83 97 % -- --   05/11/22 23:11:03 98 8 °F (37 1 °C) 89 -- 115/65 82 97 % -- --   05/11/22 22:19:05 98 8 °F (37 1 °C) 93 -- 107/65 79 97 % -- --   05/11/22 20:34:57 99 8 °F (37 7 °C) 94 -- 174/91 Abnormal  119 93 % -- Sitting   05/11/22 18:41:30 -- 92 -- 183/90 Abnormal  121 97 % -- --   05/11/22 1722 98 4 °F (36 9 °C) 91 18 173/77 Abnormal  -- 98 % None (Room air) --   05/11/22 1650 98 6 °F (37 °C) 92 20 167/77 -- 97 % None (Room air) --   05/11/22 1635 98 2 °F (36 8 °C) 92 18 175/79 Abnormal  -- 96 % None (Room air) --   05/11/22 1616 98 4 °F (36 9 °C) 88 20 171/77 Abnormal  -- 99 % None (Room air) --   05/11/22 1601 -- 97 22 172/79 Abnormal  -- 95 % None (Room air) Lying   05/11/22 1500 -- 96 23 Abnormal  175/72 Abnormal  104 100 % -- Lying   05/11/22 1445 -- 98 22 187/81 Abnormal  116 100 % None (Room air) Lying   05/11/22 1426 -- 100 22 182/80 Abnormal  -- 100 % None (Room air) Lying   05/11/22 1415 -- 96 24 Abnormal  -- -- 98 % -- --   05/11/22 1352 -- -- -- -- -- -- None (Room air) --   05/11/22 1345 -- 100 22 177/77 Abnormal  111 98 % -- Lying   05/11/22 1340 -- -- -- 177/77 Abnormal  -- -- -- Lying     Pertinent Labs/Diagnostic Test Results:   CT abdomen pelvis wo contrast   Final Result by Silvia Batres MD (05/12 1373)      No acute intra-abdominal pathology to account for anemia  Specifically, no intraperitoneal or retroperitoneal hematoma  Hiatal hernia  The entire gastric fundus is contained within the hernia sac and the gastric wall is somewhat thickened  Trace left greater than right pleural effusions  Mild body wall edema  Cholelithiasis  Few scattered colonic diverticula  Workstation performed: [id number]         XR chest 2 views   ED Interpretation by Alix Arevalo DO (05/11 1447)   Abnormal   Chest x-ray interpreted me shows bilateral pleural effusions, left greater than right, trace pulmonary vascular congestion, no focal consolidation, no old available for comparison      Final Result by Ty Edmondson MD (05/11 1604)      Small bilateral pleural effusions (left worse than right) with bibasilar atelectasis  Workstation performed: [id number]           05/11 ECHO:    Left Ventricle: Left ventricular cavity size is normal  Wall thickness is normal  The left ventricular ejection fraction is 65%  Systolic function is vigorous  Wall motion is normal  Diastolic function is normal for age  Left atrial filling pressure is normal     Right Ventricle: Right ventricular cavity size is dilated  Systolic function is normal     Left Atrium: The atrium is mildly dilated    Right Atrium: The atrium is mildly dilated    Aortic Valve: There is no evidence of stenosis    Mitral Valve: There is trace regurgitation      Pericardium: There is a small pericardial effusion circumferential to the heart  There is no echocardiographic evidence of tamponade        Results from last 7 days   Lab Units 05/12/22  0524 05/12/22  0254 05/11/22  2305 05/11/22  2222 05/11/22  1358   WBC Thousand/uL 8 50  --   --   --  8 87   HEMOGLOBIN g/dL 6 5* 5 4* 4 1* 4 2* 3 2*   HEMATOCRIT % 22 6* 19 4* 15 5* 16 0* 12 7*   PLATELETS Thousands/uL 391*  --  444*  --  469*   NEUTROS ABS Thousands/µL 5 66  --   --   --  6 85     Results from last 7 days   Lab Units 05/11/22  1358   RETIC CT ABS  71,500   RETIC CT PCT % 3 19*     Results from last 7 days   Lab Units 05/12/22  0524 05/11/22  2305 05/11/22  1358   SODIUM mmol/L 139 140 139   POTASSIUM mmol/L 3 9 3 2* 3 4*   CHLORIDE mmol/L 108 107 109*   CO2 mmol/L 24 25 19*   ANION GAP mmol/L 7 8 11   BUN mg/dL 14 14 15   CREATININE mg/dL 0 80 1 01 0 90   EGFR ml/min/1 73sq m 78 58 67   CALCIUM mg/dL 8 7 8 5 8 7     Results from last 7 days   Lab Units 05/11/22 2305 05/11/22  1358   AST U/L 41 47*   ALT U/L 55 59   ALK PHOS U/L 124* 132*   TOTAL PROTEIN g/dL 6 2* 6 6   ALBUMIN g/dL 2 9* 3 0*   TOTAL BILIRUBIN mg/dL 0 81 0 82         Results from last 7 days   Lab Units 05/12/22  0524 05/11/22  2305 05/11/22  1358   GLUCOSE RANDOM mg/dL 107 130 152*       Results from last 7 days   Lab Units 05/11/22  1735 05/11/22  1612 05/11/22  1358   HS TNI 0HR ng/L  --   --  10   HS TNI 2HR ng/L  --  11  --    HSTNI D2 ng/L  --  1  --    HS TNI 4HR ng/L 13  --   --    HSTNI D4 ng/L 3  --   --              Results from last 7 days   Lab Units 05/12/22  0524   TSH 3RD GENERATON uIU/mL 1 100       Results from last 7 days   Lab Units 05/11/22  1358   NT-PRO BNP pg/mL 369*     Results from last 7 days   Lab Units 05/11/22  1358   FERRITIN ng/mL 3*         Results from last 7 days   Lab Units 05/12/22  0535   TOTAL COUNTED  100           ED Treatment:   Medication Administration from 05/11/2022 1335 to 05/11/2022 1828       Date/Time Order Dose Route Action     05/11/2022 1404 furosemide (LASIX) injection 40 mg 40 mg Intravenous Given     05/11/2022 1625 senna (SENOKOT) tablet 8 6 mg 8 6 mg Oral Not Given     05/11/2022 1625 polyethylene glycol (MIRALAX) packet 17 g 17 g Oral Not Given     05/11/2022 1654 losartan (COZAAR) tablet 50 mg 50 mg Oral Given     05/11/2022 1639 enoxaparin (LOVENOX) subcutaneous injection 40 mg 40 mg Subcutaneous Given     05/11/2022 1639 potassium chloride (K-DUR,KLOR-CON) CR tablet 20 mEq 20 mEq Oral Given        No past medical history on file  Present on Admission:   Microcytic anemia   FALLON (dyspnea on exertion)      Admitting Diagnosis: Edema [R60 9]  Iron deficiency anemia [D50 9]  Dyspnea [R06 00]  SOB (shortness of breath) [R06 02]  FALLON (dyspnea on exertion) [R06 00]  CHF exacerbation (HCC) [I50 9]  Symptomatic anemia [D64 9]  Age/Sex: 61 y o  female  Admission Orders:  50 Hr telemetry monitoring  SCD  PT  Daily weights  I/O  NPO    Scheduled Medications:  bisacodyl, 20 mg, Oral, Once  [START ON 5/13/2022] bisacodyl, 20 mg, Oral, Once  enoxaparin, 40 mg, Subcutaneous, Daily  furosemide, 40 mg, Intravenous, BID  losartan, 50 mg, Oral, Daily  [START ON 5/13/2022] magnesium citrate, 296 mL, Oral, Once  polyethylene glycol, 4,000 mL, Oral, Once  polyethylene glycol, 17 g, Oral, Daily  potassium chloride, 20 mEq, Oral, Daily  senna, 1 tablet, Oral, Daily      Continuous IV Infusions: none     PRN Meds:  albuterol, 2 puff, Inhalation, Q4H PRN 05/12 x 1  hydrALAZINE, 5 mg, Intravenous, Q6H PRN 05/11 x 1  labetalol, 10 mg, Intravenous, Q4H PRN  nitroglycerin, 0 4 mg, Sublingual, Q5 Min PRN        IP CONSULT TO NUTRITION SERVICES  IP CONSULT TO CASE MANAGEMENT  IP CONSULT TO CASE MANAGEMENT  IP CONSULT TO GASTROENTEROLOGY    Network Utilization Review Department  ATTENTION: Please call with any questions or concerns to [phone] and carefully listen to the prompts so that you are directed to the right person   All voicemails are confidential   Eliza De La Cruz all requests for admission clinical reviews, approved or denied determinations and any other requests to dedicated fax number below belonging to the campus where the patient is receiving treatment   List of dedicated fax numbers for the Facilities:  [address] DENIALS (Administrative/Medical Necessity) [phone]   [address] (Maternity/NICU/Pediatrics) [phone]   [address] [place] [address]  [address] Medical [place] [address] [address] [address] Julio C Condon [address] [facility] [facility] [phone]

## 2022-05-12 NOTE — ASSESSMENT & PLAN NOTE
Initially presenting with blood pressure of 187/8, patient previously on antihypertensives   Blood pressure improved    Plan  · Losartan 50 mg daily

## 2022-05-12 NOTE — WOUND OSTOMY CARE
Consult Note - Wound   Camilo Hairston 61 y o  female MRN: [id number]  Unit/Bed#: -01 Encounter: [id number]        History and Present Illness: Patient is seen for wound care consult today   The patient is a 61year old female admitted with dyspnea on exertion , microcytic anemia , high output heart failure and Hypertension   She is alert and pleasant for the assessment   She is continent of bowel and bladder   She reports that she does get blisters on her legs and they open and weep at times   Assessment Findings:   1  Bilateral heels dry and intact   2  Sacral buttocks is dry and intact   3  Right and left leg with small blistered areas with no noted open areas - will order 3 M cavilon no sting to protect encourage elevation of the legs     Skin Care orders:  1-Hydraguard to sacrum, buttocks and heels BID and PRN  2-EHOB  cushion when out of bed in chair  3-Moisturize skin daily with skin nourishing cream  4-Elevate heels to offload pressure  5-Turn/reposition q2h for pressure re-distribution on skin  6  Right and left lower leg - blistered areas apply 3 M no sting daily to the areas           Wounds:  Wound 05/12/22 Pretibial Right (Active)   Wound Image   05/12/22 1038   Wound Description Clean;Dry; Intact 05/12/22 1038   Kari-wound Assessment Clean;Dry; Intact 05/12/22 1038   Wound Length (cm) 0 cm 05/12/22 1038   Wound Width (cm) 0 cm 05/12/22 1038   Wound Depth (cm) 0 cm 05/12/22 1038   Wound Surface Area (cm^2) 0 cm^2 05/12/22 1038   Wound Volume (cm^3) 0 cm^3 05/12/22 1038   Calculated Wound Volume (cm^3) 0 cm^3 05/12/22 1038   Drainage Amount None 05/12/22 1038   Treatments Site care 05/12/22 1038   Patient Tolerance Tolerated well 05/12/22 1038       Wound 05/12/22 Pretibial Left (Active)   Wound Image   05/12/22 1040   Wound Description Clean;Dry; Intact 05/12/22 1040   Kari-wound Assessment Clean;Dry; Intact 05/12/22 1040   Wound Length (cm) 0 cm 05/12/22 1040   Wound Width (cm) 0 cm 05/12/22 1040   Wound Depth (cm) 0 cm 05/12/22 1040   Wound Surface Area (cm^2) 0 cm^2 05/12/22 1040   Wound Volume (cm^3) 0 cm^3 05/12/22 1040   Calculated Wound Volume (cm^3) 0 cm^3 05/12/22 1040   Drainage Amount None 05/12/22 1040   Non-staged Wound Description Not applicable 47/83/32 4971   Treatments Site care 05/12/22 1040   Dressing Other (Comment) 05/12/22 1040   Patient Tolerance Tolerated well 05/12/22 1040         Wound care will follow weekly call or tiger text with questions     Malika Lawton RN BSN CWOCN

## 2022-05-12 NOTE — OCCUPATIONAL THERAPY NOTE
Occupational Therapy Screen        Patient Name: Paras Condon  CGRRD'M Date: 5/12/2022 05/12/22 9913   OT Last Visit   OT Visit Date 05/12/22   Note Type   Note type Screen   Additional Comments OT orders received and chart reviewed  Pt performing fxnl mobility and toileting tasks without assistance, no AD used for assistance  Acute OT not required at this time, pt with no concerns regarding d/c to home  Will sign off, please reconsult if change in fxnl ability occurs  Thank you                    SEVEN Rao, OTR/L

## 2022-05-12 NOTE — CONSULTS
Consult Service: Gastroenterology      PATIENT INFORMATION      Will Lino 61 y o  female MRN: [id number]  Unit/Bed#: -01 Encounter: [id number]  PCP: No primary care provider on file  Date of Admission:  5/11/2022  Date of Consultation: 05/12/22  Requesting Physician: Reg Abel MD       ASSESSMENTS & PLAN   Will Lino is a 61 y o  old female with no relevant past medical history (does not follow with doctors) who presents with shortness of breath found to have severe iron deficiency anemia    Severe iron-deficiency anemia - hemoglobin 3 2 on admission with iron panel showing no ferritin and transferrin saturation MCV of 58  No overt bleeding  No previous endoscopic evaluation  · Plan for EGD/colonoscopy tomorrow  Clear liquid diet today  Bowel prep tonight  · Iron supplementation  Consider IV Venofer inpatient  · Continue resuscitation    Shortness of breath - I suspect this is largely from her severe anemia though there may be a component of CHF  Patient currently on room air  · Cardiology consult pending  · See plan above    Colon cancer screening - no previous colonoscopy  No family history of colon cancer  · Plan for EGD/colonoscopy tomorrow       REASON FOR CONSULTATION      Anemia      HISTORY OF PRESENT ILLNESS      Will Lino is a 61 y o  old female with no relevant past medical history (does not follow with doctors) who presents with shortness of breath found to have severe iron deficiency anemia  Patient states for the past 1 month she has noticed significant it exertional dyspnea and fatigue that is progressively worsening  Denies abdominal pain nausea vomiting  Reports 1 brown bowel movement per day  No NSAIDs  No blood thinners  No previous endoscopic evaluation  No fevers chills or weight loss  No alcohol or smoking history  No GI history of cancer  REVIEW OF SYSTEMS     A thorough 12-point review of systems has been conducted  Pertinent positives and negatives are mentioned in the history of present illness  PAST MEDICAL & SURGICAL HISTORY      No past medical history on file  Past Surgical History:   Procedure Laterality Date    TONSILLECTOMY           MEDICATIONS & ALLERGIES       Medications:   Prior to Admission medications    Not on File       Allergies:    Allergies   Allergen Reactions    Penicillins Hives    Shellfish-Derived Products - Food Allergy GI Intolerance    Latex Rash         SOCIAL HISTORY      Marital Status: Single    Substance Use History:   Social History     Substance and Sexual Activity   Alcohol Use None     Social History     Tobacco Use   Smoking Status Never Smoker   Smokeless Tobacco Never Used     Social History     Substance and Sexual Activity   Drug Use Not on file         FAMILY HISTORY      Non-Contributory      PHYSICAL EXAM     Vitals:   Blood Pressure: 164/89 (05/12/22 0750)  Pulse: 78 (05/12/22 0750)  Temperature: 98 2 °F (36 8 °C) (05/12/22 0750)  Temp Source: Oral (05/12/22 0505)  Respirations: 12 (05/12/22 0750)  Height: 5' 11" (180 3 cm) (05/11/22 1722)  Weight - Scale: 114 kg (251 lb 3 2 oz) (05/12/22 0538)  SpO2: 97 % (05/12/22 0748)    Physical Exam:   GENERAL: NAD  HEENT:  NC/AT, no scleral icterus  CARDIAC:  RRR, +S1/S2  PULMONARY:  CTA B/L, no wheezing/rales/rhonci, non-labored breathing  ABDOMEN:  Soft, NT/ND, +BS, no rebound/guarding/rigidity  Extremities:  No edema, cyanosis, or clubbing  NEUROLOGIC:  Alert  SKIN:  No rashes or erythema      ADDITIONAL DATA     Lab Results:     Results from last 7 days   Lab Units 05/12/22  0524   WBC Thousand/uL 8 50   HEMOGLOBIN g/dL 6 5*   HEMATOCRIT % 22 6*   PLATELETS Thousands/uL 391*   NEUTROS PCT % 66   LYMPHS PCT % 21   MONOS PCT % 10   EOS PCT % 1     Results from last 7 days   Lab Units 05/12/22  0524 05/11/22  2305   POTASSIUM mmol/L 3 9 3 2*   CHLORIDE mmol/L 108 107   CO2 mmol/L 24 25   BUN mg/dL 14 14   CREATININE mg/dL 0 80 1 01   CALCIUM mg/dL 8 7 8 5   ALK PHOS U/L  --  124*   ALT U/L  --  55   AST U/L  --  41           Imaging:    CT abdomen pelvis wo contrast    Result Date: 5/12/2022  Narrative: CT ABDOMEN AND PELVIS WITHOUT IV CONTRAST INDICATION:   Anemia Retroperitoneal hematoma suspected severe anemia unresponsive to fluids, r/o retroperitoneal hematoma  COMPARISON:  None  TECHNIQUE:  CT examination of the abdomen and pelvis was performed without intravenous contrast  This examination was performed without intravenous contrast in the context of the critical nationwide Omnipaque shortage  Axial, sagittal, and coronal 2D reformatted images were created from the source data and submitted for interpretation  Radiation dose length product (DLP) for this visit:  1148  37 mGy-cm   This examination, like all CT scans performed in the [facility], was performed utilizing techniques to minimize radiation dose exposure, including the use of iterative reconstruction and automated exposure control  Enteric contrast was administered  FINDINGS: ABDOMEN LOWER CHEST:  Trace bibasilar left greater than right pleural effusions  Hiatal hernia with the entirety of the gastric fundus identified in the hernia sac  Relatively low density of blood within the cardiac chambers notable on image 1 of series 2 in keeping with reported history of anemia  LIVER/BILIARY TREE:  Unremarkable  GALLBLADDER:  There are gallstone(s) within the gallbladder, without pericholecystic inflammatory changes  SPLEEN:  Unremarkable  PANCREAS:  Unremarkable  ADRENAL GLANDS:  Unremarkable  KIDNEYS/URETERS:  Unremarkable  No hydronephrosis  STOMACH AND BOWEL:  Few colonic diverticula noted  Hiatal hernia with thickening of the wall the stomach in the hernia sac  No bowel thickening or bowel obstruction  APPENDIX:  A normal appendix was visualized  ABDOMINOPELVIC CAVITY:  No ascites  No pneumoperitoneum  No lymphadenopathy   VESSELS:  Unremarkable for patient's age  PELVIS REPRODUCTIVE ORGANS:  Unremarkable for patient's age  URINARY BLADDER:  Unremarkable  ABDOMINAL WALL/INGUINAL REGIONS:  There is a small fat-containing umbilical hernia  Mild body wall edema  OSSEOUS STRUCTURES:  No acute fracture or destructive osseous lesion  Mild left convex curvature of lumbar spine, apex at L2  Impression: No acute intra-abdominal pathology to account for anemia  Specifically, no intraperitoneal or retroperitoneal hematoma  Hiatal hernia  The entire gastric fundus is contained within the hernia sac and the gastric wall is somewhat thickened  Trace left greater than right pleural effusions  Mild body wall edema  Cholelithiasis  Few scattered colonic diverticula  Workstation performed: [id number]     XR chest 2 views    Result Date: 5/11/2022  Narrative: CHEST INDICATION:   sob  Shortness of breath  COMPARISON:  None EXAM PERFORMED/VIEWS:  XR CHEST PA & LATERAL Images: 4 FINDINGS: Cardiomediastinal silhouette appears unremarkable  Bibasilar atelectasis (left worse than right)  Small bilateral pleural effusions (left worse than right)  No pneumothorax  Osseous structures appear within normal limits for patient age  Impression: Small bilateral pleural effusions (left worse than right) with bibasilar atelectasis  Workstation performed: [id number]     [address] [facility] bedside procedure    Result Date: 5/11/2022  Narrative: 1 2 840 [id number]  2 446 246 [id number] [id number]    Echo complete    Result Date: 5/11/2022  Narrative: Cristobal Gates  Left Ventricle: Left ventricular cavity size is normal  Wall thickness is normal  The left ventricular ejection fraction is 65%  Systolic function is vigorous  Wall motion is normal  Diastolic function is normal for age  Left atrial filling pressure is normal    Right Ventricle: Right ventricular cavity size is dilated  Systolic function is normal    Left Atrium: The atrium is mildly dilated    Right Atrium: The atrium is mildly dilated     Aortic Valve: There is no evidence of stenosis    Mitral Valve: There is trace regurgitation    Pericardium: There is a small pericardial effusion circumferential to the heart  There is no echocardiographic evidence of tamponade  EKG, Pathology, and Other Studies Reviewed on Admission:   · EKG: Reviewed      Counseling / Coordination of Care Time: 30 total mins spent n consult  Greater than 50% of total time spent on patient counseling and coordination of care     ** Please Note: This note is constructed using a voice recognition dictation system   **

## 2022-05-12 NOTE — ASSESSMENT & PLAN NOTE
In the setting of hemoglobin 3 2  Small bilateral pleural effusions but no vascular congestion or airspace opacities on chest x-ray  Likely secondary to symptomatic anemia

## 2022-05-13 ENCOUNTER — APPOINTMENT (INPATIENT)
Dept: GASTROENTEROLOGY | Facility: HOSPITAL | Age: 67
DRG: 663 | End: 2022-05-13
Payer: COMMERCIAL

## 2022-05-13 ENCOUNTER — ANESTHESIA EVENT (INPATIENT)
Dept: GASTROENTEROLOGY | Facility: HOSPITAL | Age: 67
DRG: 663 | End: 2022-05-13
Payer: COMMERCIAL

## 2022-05-13 ENCOUNTER — ANESTHESIA (INPATIENT)
Dept: GASTROENTEROLOGY | Facility: HOSPITAL | Age: 67
DRG: 663 | End: 2022-05-13
Payer: COMMERCIAL

## 2022-05-13 PROBLEM — D64.9 SYMPTOMATIC ANEMIA: Status: ACTIVE | Noted: 2022-05-11

## 2022-05-13 LAB
ABO GROUP BLD BPU: NORMAL
ABO GROUP BLD BPU: NORMAL
ANION GAP SERPL CALCULATED.3IONS-SCNC: 4 MMOL/L (ref 4–13)
ATRIAL RATE: 100 BPM
BASOPHILS # BLD AUTO: 0.1 THOUSANDS/ΜL (ref 0–0.1)
BASOPHILS NFR BLD AUTO: 1 % (ref 0–1)
BPU ID: NORMAL
BPU ID: NORMAL
BUN SERPL-MCNC: 8 MG/DL (ref 5–25)
CALCIUM SERPL-MCNC: 9.2 MG/DL (ref 8.3–10.1)
CHLORIDE SERPL-SCNC: 105 MMOL/L (ref 100–108)
CO2 SERPL-SCNC: 33 MMOL/L (ref 21–32)
CREAT SERPL-MCNC: 0.8 MG/DL (ref 0.6–1.3)
CROSSMATCH: NORMAL
CROSSMATCH: NORMAL
EOSINOPHIL # BLD AUTO: 0.22 THOUSAND/ΜL (ref 0–0.61)
EOSINOPHIL NFR BLD AUTO: 2 % (ref 0–6)
GFR SERPL CREATININE-BSD FRML MDRD: 78 ML/MIN/1.73SQ M
GLUCOSE SERPL-MCNC: 88 MG/DL (ref 65–140)
HAPTOGLOB SERPL-MCNC: 200 MG/DL (ref 37–355)
HCT VFR BLD AUTO: 29.1 % (ref 34.8–46.1)
HGB BLD-MCNC: 8.6 G/DL (ref 11.5–15.4)
IMM GRANULOCYTES # BLD AUTO: 0.07 THOUSAND/UL (ref 0–0.2)
IMM GRANULOCYTES NFR BLD AUTO: 1 % (ref 0–2)
LYMPHOCYTES # BLD AUTO: 2.09 THOUSANDS/ΜL (ref 0.6–4.47)
LYMPHOCYTES NFR BLD AUTO: 21 % (ref 14–44)
MCH RBC QN AUTO: 21.5 PG (ref 26.8–34.3)
MCHC RBC AUTO-ENTMCNC: 29.6 G/DL (ref 31.4–37.4)
MCV RBC AUTO: 73 FL (ref 82–98)
MONOCYTES # BLD AUTO: 0.97 THOUSAND/ΜL (ref 0.17–1.22)
MONOCYTES NFR BLD AUTO: 10 % (ref 4–12)
NEUTROPHILS # BLD AUTO: 6.57 THOUSANDS/ΜL (ref 1.85–7.62)
NEUTS SEG NFR BLD AUTO: 65 % (ref 43–75)
NRBC BLD AUTO-RTO: 3 /100 WBCS
P AXIS: 64 DEGREES
PLATELET # BLD AUTO: 425 THOUSANDS/UL (ref 149–390)
PMV BLD AUTO: 11.4 FL (ref 8.9–12.7)
POTASSIUM SERPL-SCNC: 3.1 MMOL/L (ref 3.5–5.3)
PR INTERVAL: 122 MS
QRS AXIS: 47 DEGREES
QRSD INTERVAL: 74 MS
QT INTERVAL: 316 MS
QTC INTERVAL: 407 MS
RBC # BLD AUTO: 4 MILLION/UL (ref 3.81–5.12)
SODIUM SERPL-SCNC: 142 MMOL/L (ref 136–145)
T WAVE AXIS: 30 DEGREES
UNIT DISPENSE STATUS: NORMAL
UNIT DISPENSE STATUS: NORMAL
UNIT PRODUCT CODE: NORMAL
UNIT PRODUCT CODE: NORMAL
UNIT PRODUCT VOLUME: 350 ML
UNIT PRODUCT VOLUME: 350 ML
UNIT RH: NORMAL
UNIT RH: NORMAL
VENTRICULAR RATE: 100 BPM
WBC # BLD AUTO: 10.02 THOUSAND/UL (ref 4.31–10.16)

## 2022-05-13 PROCEDURE — 45385 COLONOSCOPY W/LESION REMOVAL: CPT | Performed by: INTERNAL MEDICINE

## 2022-05-13 PROCEDURE — 80048 BASIC METABOLIC PNL TOTAL CA: CPT | Performed by: STUDENT IN AN ORGANIZED HEALTH CARE EDUCATION/TRAINING PROGRAM

## 2022-05-13 PROCEDURE — 85025 COMPLETE CBC W/AUTO DIFF WBC: CPT | Performed by: STUDENT IN AN ORGANIZED HEALTH CARE EDUCATION/TRAINING PROGRAM

## 2022-05-13 PROCEDURE — NC001 PR NO CHARGE: Performed by: INTERNAL MEDICINE

## 2022-05-13 PROCEDURE — 88305 TISSUE EXAM BY PATHOLOGIST: CPT | Performed by: SPECIALIST

## 2022-05-13 PROCEDURE — 0DBK8ZZ EXCISION OF ASCENDING COLON, VIA NATURAL OR ARTIFICIAL OPENING ENDOSCOPIC: ICD-10-PCS | Performed by: INTERNAL MEDICINE

## 2022-05-13 PROCEDURE — 0DB78ZX EXCISION OF STOMACH, PYLORUS, VIA NATURAL OR ARTIFICIAL OPENING ENDOSCOPIC, DIAGNOSTIC: ICD-10-PCS | Performed by: INTERNAL MEDICINE

## 2022-05-13 PROCEDURE — 43239 EGD BIOPSY SINGLE/MULTIPLE: CPT | Performed by: INTERNAL MEDICINE

## 2022-05-13 PROCEDURE — 93010 ELECTROCARDIOGRAM REPORT: CPT | Performed by: INTERNAL MEDICINE

## 2022-05-13 PROCEDURE — 0DBN8ZZ EXCISION OF SIGMOID COLON, VIA NATURAL OR ARTIFICIAL OPENING ENDOSCOPIC: ICD-10-PCS | Performed by: INTERNAL MEDICINE

## 2022-05-13 PROCEDURE — 99232 SBSQ HOSP IP/OBS MODERATE 35: CPT | Performed by: INTERNAL MEDICINE

## 2022-05-13 RX ORDER — FENTANYL CITRATE 50 UG/ML
INJECTION, SOLUTION INTRAMUSCULAR; INTRAVENOUS AS NEEDED
Status: DISCONTINUED | OUTPATIENT
Start: 2022-05-13 | End: 2022-05-13

## 2022-05-13 RX ORDER — LOSARTAN POTASSIUM 50 MG/1
50 TABLET ORAL DAILY
Status: DISCONTINUED | OUTPATIENT
Start: 2022-05-14 | End: 2022-05-14 | Stop reason: HOSPADM

## 2022-05-13 RX ORDER — PROPOFOL 10 MG/ML
INJECTION, EMULSION INTRAVENOUS CONTINUOUS PRN
Status: DISCONTINUED | OUTPATIENT
Start: 2022-05-13 | End: 2022-05-13

## 2022-05-13 RX ORDER — LOSARTAN POTASSIUM 50 MG/1
100 TABLET ORAL DAILY
Status: DISCONTINUED | OUTPATIENT
Start: 2022-05-14 | End: 2022-05-13

## 2022-05-13 RX ORDER — SODIUM CHLORIDE 9 MG/ML
INJECTION, SOLUTION INTRAVENOUS CONTINUOUS PRN
Status: DISCONTINUED | OUTPATIENT
Start: 2022-05-13 | End: 2022-05-13

## 2022-05-13 RX ORDER — POTASSIUM CHLORIDE 20 MEQ/1
40 TABLET, EXTENDED RELEASE ORAL ONCE
Status: COMPLETED | OUTPATIENT
Start: 2022-05-13 | End: 2022-05-13

## 2022-05-13 RX ORDER — LIDOCAINE HYDROCHLORIDE 10 MG/ML
INJECTION, SOLUTION EPIDURAL; INFILTRATION; INTRACAUDAL; PERINEURAL AS NEEDED
Status: DISCONTINUED | OUTPATIENT
Start: 2022-05-13 | End: 2022-05-13

## 2022-05-13 RX ORDER — PROPOFOL 10 MG/ML
INJECTION, EMULSION INTRAVENOUS AS NEEDED
Status: DISCONTINUED | OUTPATIENT
Start: 2022-05-13 | End: 2022-05-13

## 2022-05-13 RX ADMIN — POTASSIUM CHLORIDE 20 MEQ: 1500 TABLET, EXTENDED RELEASE ORAL at 08:24

## 2022-05-13 RX ADMIN — PROPOFOL 50 MG: 10 INJECTION, EMULSION INTRAVENOUS at 14:38

## 2022-05-13 RX ADMIN — Medication 296 ML: at 05:24

## 2022-05-13 RX ADMIN — LIDOCAINE HYDROCHLORIDE 100 MG: 10 INJECTION, SOLUTION EPIDURAL; INFILTRATION; INTRACAUDAL at 14:35

## 2022-05-13 RX ADMIN — SODIUM CHLORIDE: 900 INJECTION, SOLUTION INTRAVENOUS at 14:28

## 2022-05-13 RX ADMIN — PROPOFOL 100 MCG/KG/MIN: 10 INJECTION, EMULSION INTRAVENOUS at 14:35

## 2022-05-13 RX ADMIN — LOSARTAN POTASSIUM 50 MG: 50 TABLET, FILM COATED ORAL at 08:23

## 2022-05-13 RX ADMIN — PROPOFOL 150 MG: 10 INJECTION, EMULSION INTRAVENOUS at 14:35

## 2022-05-13 RX ADMIN — BISACODYL 20 MG: 5 TABLET, COATED ORAL at 05:24

## 2022-05-13 RX ADMIN — PROPOFOL 50 MG: 10 INJECTION, EMULSION INTRAVENOUS at 14:41

## 2022-05-13 RX ADMIN — PROPOFOL 20 MG: 10 INJECTION, EMULSION INTRAVENOUS at 14:59

## 2022-05-13 RX ADMIN — POTASSIUM CHLORIDE 40 MEQ: 1500 TABLET, EXTENDED RELEASE ORAL at 08:24

## 2022-05-13 RX ADMIN — PROPOFOL 30 MG: 10 INJECTION, EMULSION INTRAVENOUS at 15:20

## 2022-05-13 RX ADMIN — FUROSEMIDE 40 MG: 10 INJECTION, SOLUTION INTRAMUSCULAR; INTRAVENOUS at 08:24

## 2022-05-13 RX ADMIN — FUROSEMIDE 40 MG: 10 INJECTION, SOLUTION INTRAMUSCULAR; INTRAVENOUS at 17:09

## 2022-05-13 RX ADMIN — ENOXAPARIN SODIUM 40 MG: 40 INJECTION SUBCUTANEOUS at 08:24

## 2022-05-13 RX ADMIN — FENTANYL CITRATE 25 MCG: 50 INJECTION, SOLUTION INTRAMUSCULAR; INTRAVENOUS at 14:43

## 2022-05-13 NOTE — QUICK NOTE
No source of anemia found during this endoscopic evaluation  Please see EGD and colonoscopy report for further details  Outpatient follow-up with Hematology  Gastroenterology will sign off  Please call with questions

## 2022-05-13 NOTE — PROGRESS NOTES
INTERNAL MEDICINE RESIDENCY PROGRESS NOTE     Name: Jessica Boothe   Age & Sex: 61 y o  female   MRN: [id number]  Unit/Bed#: -01   Encounter: [id number]  Team: SOD Team C     PATIENT INFORMATION     Name: Jessica Boothe   Age & Sex: 61 y o  female   MRN: [id number]  Hospital Stay Days: 2    ASSESSMENT/PLAN     Principal Problem:    Symptomatic anemia  Active Problems:    FALLON (dyspnea on exertion)    High output heart failure (HCC)    Hypertension      Hypertension  Assessment & Plan  Initially presenting with blood pressure of 187/81  Blood pressure remains persistently elevated to 160s over 80s  Patient previously on antihypertensives  Plan  -losartan 50 mg daily  -can consider adding thiazide diuretic if blood pressure cannot be controlled on losartan alone  - p r n  Hydralazine and labetalol    High output heart failure (HCC)  Assessment & Plan  Wt Readings from Last 3 Encounters:   05/11/22 109 kg (240 lb)     Presents dyspnea on exertion, bilateral leg and abdominal swelling, PND, oliguria for the past 2 weeks  Patient has increased her fluid intake due to a dry cough  Hypertensive (177/77) and tachycardic (113) on admission, saturating adequately on room air  Bilateral 2+ pitting leg edema, JVD  In the setting of severe anemia with hemoglobin 3 2, , troponin 10  PA and lateral chest x-ray shows small bilateral pleural effusions left worse than right with bibasilar atelectasis  Echocardiogram demonstrates no heart failure, LVEF 65%, hyperdynamic  TSH within normal limits  Patient responding appropriately Lasix, lower extremity edema improving    Plan  · IV Lasix 40 mg b i d  with KDur 20mEq daily  · Discontinue metoprolol given no heart failure on echo  · Strict I/O, daily weights  · Monitor on telemetry  · P T /OT, Nutrition, and Case Management consults  · Check lipid panel hemoglobin A1c      FALLON (dyspnea on exertion)  Assessment & Plan  In the setting of hemoglobin 3 2  Small bilateral pleural effusions but no vascular congestion or airspace opacities on chest x-ray  Likely secondary to symptomatic anemia    * Symptomatic anemia  Assessment & Plan  Hemoglobin 3 2 and MCV 58  Denies hematuria, hematochezia, NSAID use, hematemesis, cancer history  Never had colonoscopy  Does endorse single episode of black stool several weeks ago, has not recurred  Denies any history of abnormal vaginal bleeding  Patient does endorse decades long history of anemia, although she denies having symptoms until recently  She does not know what her baseline hemoglobin is  Patient denies any family history of anemia  Family is of [place]  descent  Differential diagnosis includes GI bleed versus thalassemia versus hemolytic disease  Plan  · Status post 4 units PRBC with appropriate response  · Hemoglobin improved to 6 5 after 3 units  · H&H q 12 hours  · No bleed seen on CT  · GI consulted, appreciate recs  · EGD and scope today  · Transfuse if Hg < 7 or if symptomatic  · F/u iron panel, haptoglobin, LDH  · Consider Venofer       Disposition: Inpatient, EGD and scope today, continue diuresis    SUBJECTIVE     Patient seen and examined  No acute events overnight  Patient states that she is feeling better, able to get out of bed and walk around room  Urinating frequently  Leg swelling still present, improving  Denies CP, SOB, weakness, fatigue, abd pain, N/V/D      OBJECTIVE     Vitals:    22 1225 22 1446 22 0600 22 0752   BP: 139/86 140/84  147/84   BP Location:    Right arm   Pulse: 74 73  79   Resp: 12 18  19   Temp: 99 7 °F (37 6 °C) 98 9 °F (37 2 °C)  98 3 °F (36 8 °C)   TempSrc:    Oral   SpO2:  96%  98%   Weight:   109 kg (239 lb 9 6 oz)    Height:          Temperature:   Temp (24hrs), Av 2 °F (37 3 °C), Min:98 3 °F (36 8 °C), Max:99 7 °F (37 6 °C)    Temperature: 98 3 °F (36 8 °C)  Intake & Output:  I/O        0701   0700  0701  05/13 0700    P  O  200 1060    Blood 1050 572 9    Total Intake(mL/kg) 1250 (11) 1632 9 (15)    Urine (mL/kg/hr) 3650 3000 (1 1)    Total Output 3650 3000    Net -2400 -1367 1          Unmeasured Urine Occurrence 2 x         Weights:        Body mass index is 33 42 kg/m²  Weight (last 2 days)     Date/Time Weight    05/13/22 0600 109 (239 6)    05/12/22 0538 114 (251 2)    05/11/22 1722 109 (240)        Physical Exam  Vitals and nursing note reviewed  Constitutional:       General: She is not in acute distress  Appearance: She is obese  She is not ill-appearing or toxic-appearing  HENT:      Head: Normocephalic and atraumatic  Right Ear: External ear normal       Left Ear: External ear normal       Nose: Nose normal    Eyes:      General:         Right eye: No discharge  Left eye: No discharge  Extraocular Movements: Extraocular movements intact  Conjunctiva/sclera: Conjunctivae normal    Cardiovascular:      Rate and Rhythm: Normal rate and regular rhythm  Pulses: Normal pulses  Heart sounds: Normal heart sounds  No friction rub  Pulmonary:      Effort: Pulmonary effort is normal  No respiratory distress  Breath sounds: Normal breath sounds  Abdominal:      General: Abdomen is flat  Bowel sounds are normal       Palpations: Abdomen is soft  Tenderness: There is no abdominal tenderness  Musculoskeletal:      Right lower leg: Edema present  Left lower leg: Edema present  Skin:     General: Skin is warm and dry  Coloration: Skin is not jaundiced  Neurological:      Mental Status: She is alert and oriented to person, place, and time  Psychiatric:         Mood and Affect: Mood normal          Behavior: Behavior normal        LABORATORY DATA     Labs: I have personally reviewed pertinent reports    Results from last 7 days   Lab Units 05/13/22  0519 05/12/22  1816 05/12/22  0524 05/12/22  0254 05/11/22  2305 05/11/22  2222 05/11/22  1358 WBC Thousand/uL 10 02  --  8 50  --   --   --  8 87   HEMOGLOBIN g/dL 8 6* 8 8* 6 5*   < > 4 1*   < > 3 2*   HEMATOCRIT % 29 1* 28 7* 22 6*   < > 15 5*   < > 12 7*   PLATELETS Thousands/uL 425*  --  391*  --  444*  --  469*   NEUTROS PCT % 65  --  66  --   --   --  77*   MONOS PCT % 10  --  10  --   --   --  7    < > = values in this interval not displayed  Results from last 7 days   Lab Units 05/13/22  0519 05/12/22  0524 05/11/22  2305 05/11/22  1358   POTASSIUM mmol/L 3 1* 3 9 3 2* 3 4*   CHLORIDE mmol/L 105 108 107 109*   CO2 mmol/L 33* 24 25 19*   BUN mg/dL 8 14 14 15   CREATININE mg/dL 0 80 0 80 1 01 0 90   CALCIUM mg/dL 9 2 8 7 8 5 8 7   ALK PHOS U/L  --   --  124* 132*   ALT U/L  --   --  55 59   AST U/L  --   --  41 47*                            IMAGING & DIAGNOSTIC TESTING     Radiology Results: I have personally reviewed pertinent reports  CT abdomen pelvis wo contrast    Result Date: 5/12/2022  Impression: No acute intra-abdominal pathology to account for anemia  Specifically, no intraperitoneal or retroperitoneal hematoma  Hiatal hernia  The entire gastric fundus is contained within the hernia sac and the gastric wall is somewhat thickened  Trace left greater than right pleural effusions  Mild body wall edema  Cholelithiasis  Few scattered colonic diverticula  Workstation performed: [id number]     XR chest 2 views    Result Date: 5/11/2022  Impression: Small bilateral pleural effusions (left worse than right) with bibasilar atelectasis  Workstation performed: [id number]     Other Diagnostic Testing: I have personally reviewed pertinent reports      ACTIVE MEDICATIONS     Current Facility-Administered Medications   Medication Dose Route Frequency    albuterol (PROVENTIL HFA,VENTOLIN HFA) inhaler 2 puff  2 puff Inhalation Q4H PRN    enoxaparin (LOVENOX) subcutaneous injection 40 mg  40 mg Subcutaneous Daily    furosemide (LASIX) injection 40 mg  40 mg Intravenous BID    hydrALAZINE (APRESOLINE) injection 5 mg  5 mg Intravenous Q6H PRN    labetalol (NORMODYNE) injection 10 mg  10 mg Intravenous Q4H PRN    [START ON 5/14/2022] losartan (COZAAR) tablet 100 mg  100 mg Oral Daily    nitroglycerin (NITROSTAT) SL tablet 0 4 mg  0 4 mg Sublingual Q5 Min PRN    pantoprazole (PROTONIX) EC tablet 40 mg  40 mg Oral Early Morning    polyethylene glycol (MIRALAX) packet 17 g  17 g Oral Daily    potassium chloride (K-DUR,KLOR-CON) CR tablet 20 mEq  20 mEq Oral Daily    senna (SENOKOT) tablet 8 6 mg  1 tablet Oral Daily       VTE Pharmacologic Prophylaxis: Enoxaparin (Lovenox)  VTE Mechanical Prophylaxis: sequential compression device    Portions of the record may have been created with voice recognition software  Occasional wrong word or "sound a like" substitutions may have occurred due to the inherent limitations of voice recognition software    Read the chart carefully and recognize, using context, where substitutions have occurred   ==  Gerda Tomlinson, [address] [facility]  Internal Medicine Residency PGY-1

## 2022-05-13 NOTE — ANESTHESIA PREPROCEDURE EVALUATION
Procedure:  COLONOSCOPY  EGD    Relevant Problems   CARDIO   (+) FALLON (dyspnea on exertion)   (+) Hypertension      HEMATOLOGY   (+) Symptomatic anemia      PULMONARY   (+) FALLON (dyspnea on exertion)        Physical Exam    Airway    Mallampati score: II  TM Distance: >3 FB  Neck ROM: full     Dental       Cardiovascular      Pulmonary      Other Findings        Anesthesia Plan  ASA Score- 2     Anesthesia Type- IV sedation with anesthesia with ASA Monitors  Additional Monitors:   Airway Plan:           Plan Factors-Exercise tolerance (METS): >4 METS  Chart reviewed  EKG reviewed  Imaging results reviewed  Existing labs reviewed  Patient summary reviewed  Patient is not a current smoker  Patient did not smoke on day of surgery  Obstructive sleep apnea risk education given perioperatively  Induction- intravenous  Postoperative Plan-     Informed Consent- Anesthetic plan and risks discussed with patient  I personally reviewed this patient with the CRNA  Discussed and agreed on the Anesthesia Plan with the CRNA           NPO appropriate  Discussed benefits/risks of monitored anesthetic care and discussed providing a dynamic level of mild to deep sedation  Risks include awareness, airway obstruction, aspiration which may necessitate conversion to general anesthesia  All questions answered  Patient understands and wishes to proceed  Anesthesia plan and consent discussed with Nancy Aldana who expressed understanding and agreement  Risks/benefits and alternatives discussed with patient including possible PONV, sore throat, damage to teeth/lips/gums and possibility of rare anesthetic and surgical emergencies

## 2022-05-13 NOTE — PLAN OF CARE
Problem: MOBILITY - ADULT  Goal: Maintain or return to baseline ADL function  Description: INTERVENTIONS:  -  Assess patient's ability to carry out ADLs; assess patient's baseline for ADL function and identify physical deficits which impact ability to perform ADLs (bathing, care of mouth/teeth, toileting, grooming, dressing, etc )  - Assess/evaluate cause of self-care deficits   - Assess range of motion  - Assess patient's mobility; develop plan if impaired  - Assess patient's need for assistive devices and provide as appropriate  - Encourage maximum independence but intervene and supervise when necessary  - Involve family in performance of ADLs  - Assess for home care needs following discharge   - Consider OT consult to assist with ADL evaluation and planning for discharge  - Provide patient education as appropriate  Outcome: Progressing  Goal: Maintains/Returns to pre admission functional level  Description: INTERVENTIONS:  - Perform BMAT or MOVE assessment daily    - Set and communicate daily mobility goal to care team and patient/family/caregiver  - Collaborate with rehabilitation services on mobility goals if consulted  - Perform Range of Motion  times a day  - Reposition patient every  hours    - Dangle patient  times a day  - Stand patient  times a day  - Ambulate patient  times a day  - Out of bed to chair  times a day   - Out of bed for meals  times a day  - Out of bed for toileting  - Record patient progress and toleration of activity level   Outcome: Progressing     Problem: PAIN - ADULT  Goal: Verbalizes/displays adequate comfort level or baseline comfort level  Description: Interventions:  - Encourage patient to monitor pain and request assistance  - Assess pain using appropriate pain scale  - Administer analgesics based on type and severity of pain and evaluate response  - Implement non-pharmacological measures as appropriate and evaluate response  - Consider cultural and social influences on pain and pain management  - Notify physician/advanced practitioner if interventions unsuccessful or patient reports new pain  Outcome: Progressing     Problem: INFECTION - ADULT  Goal: Absence or prevention of progression during hospitalization  Description: INTERVENTIONS:  - Assess and monitor for signs and symptoms of infection  - Monitor lab/diagnostic results  - Monitor all insertion sites, i e  indwelling lines, tubes, and drains  - Monitor endotracheal if appropriate and nasal secretions for changes in amount and color  - [place] appropriate cooling/warming therapies per order  - Administer medications as ordered  - Instruct and encourage patient and family to use good hand hygiene technique  - Identify and instruct in appropriate isolation precautions for identified infection/condition  Outcome: Progressing  Goal: Absence of fever/infection during neutropenic period  Description: INTERVENTIONS:  - Monitor WBC    Outcome: Progressing     Problem: SAFETY ADULT  Goal: Maintain or return to baseline ADL function  Description: INTERVENTIONS:  -  Assess patient's ability to carry out ADLs; assess patient's baseline for ADL function and identify physical deficits which impact ability to perform ADLs (bathing, care of mouth/teeth, toileting, grooming, dressing, etc )  - Assess/evaluate cause of self-care deficits   - Assess range of motion  - Assess patient's mobility; develop plan if impaired  - Assess patient's need for assistive devices and provide as appropriate  - Encourage maximum independence but intervene and supervise when necessary  - Involve family in performance of ADLs  - Assess for home care needs following discharge   - Consider OT consult to assist with ADL evaluation and planning for discharge  - Provide patient education as appropriate  Outcome: Progressing  Goal: Maintains/Returns to pre admission functional level  Description: INTERVENTIONS:  - Perform BMAT or MOVE assessment daily    - Set and communicate daily mobility goal to care team and patient/family/caregiver  - Collaborate with rehabilitation services on mobility goals if consulted  - Perform Range of Motion  times a day  - Reposition patient every  hours    - Dangle patient times a day  - Stand patient  times a day  - Ambulate patient  times a day  - Out of bed to chair  times a day   - Out of bed for meals  times a day  - Out of bed for toileting  - Record patient progress and toleration of activity level   Outcome: Progressing  Goal: Patient will remain free of falls  Description: INTERVENTIONS:  - Educate patient/family on patient safety including physical limitations  - Instruct patient to call for assistance with activity   - Consult OT/PT to assist with strengthening/mobility   - Keep Call bell within reach  - Keep bed low and locked with side rails adjusted as appropriate  - Keep care items and personal belongings within reach  - Initiate and maintain comfort rounds  - Make Fall Risk Sign visible to staff  - Offer Toileting every  Hours, in advance of need  - Initiate/Maintain alarm  - Obtain necessary fall risk management equipment:   - Apply yellow socks and bracelet for high fall risk patients  - Consider moving patient to room near nurses station  Outcome: Progressing     Problem: DISCHARGE PLANNING  Goal: Discharge to home or other facility with appropriate resources  Description: INTERVENTIONS:  - Identify barriers to discharge w/patient and caregiver  - Arrange for needed discharge resources and transportation as appropriate  - Identify discharge learning needs (meds, wound care, etc )  - Arrange for interpretive services to assist at discharge as needed  - Refer to Case Management Department for coordinating discharge planning if the patient needs post-hospital services based on physician/advanced practitioner order or complex needs related to functional status, cognitive ability, or social support system  Outcome: Progressing Problem: Knowledge Deficit  Goal: Patient/family/caregiver demonstrates understanding of disease process, treatment plan, medications, and discharge instructions  Description: Complete learning assessment and assess knowledge base    Interventions:  - Provide teaching at level of understanding  - Provide teaching via preferred learning methods  Outcome: Progressing     Problem: Potential for Falls  Goal: Patient will remain free of falls  Description: INTERVENTIONS:  - Educate patient/family on patient safety including physical limitations  - Instruct patient to call for assistance with activity   - Consult OT/PT to assist with strengthening/mobility   - Keep Call bell within reach  - Keep bed low and locked with side rails adjusted as appropriate  - Keep care items and personal belongings within reach  - Initiate and maintain comfort rounds  - Make Fall Risk Sign visible to staff  - Offer Toileting every Hours, in advance of need  - Initiate/Maintain alarm  - Obtain necessary fall risk management equipment:   - Apply yellow socks and bracelet for high fall risk patients  - Consider moving patient to room near nurses station  Outcome: Progressing

## 2022-05-13 NOTE — ANESTHESIA POSTPROCEDURE EVALUATION
Post-Op Assessment Note    CV Status:  Stable  Pain Score: 0    Pain management: adequate     Mental Status:  Sleepy   Hydration Status:  Euvolemic   PONV Controlled:  Controlled   Airway Patency:  Patent      Post Op Vitals Reviewed: Yes      Staff: CRNA         No complications documented      BP 98/55 (05/13/22 1532)    Temp (!) 96 5 °F (35 8 °C) (05/13/22 1532)    Pulse 64 (05/13/22 1532)   Resp 16 (05/13/22 1532)    SpO2 98 % (05/13/22 1532)

## 2022-05-14 VITALS
HEIGHT: 71 IN | OXYGEN SATURATION: 100 % | HEART RATE: 84 BPM | SYSTOLIC BLOOD PRESSURE: 139 MMHG | BODY MASS INDEX: 32.81 KG/M2 | DIASTOLIC BLOOD PRESSURE: 71 MMHG | RESPIRATION RATE: 16 BRPM | WEIGHT: 234.4 LBS | TEMPERATURE: 98.4 F

## 2022-05-14 PROBLEM — K63.5 COLON POLYPS: Status: ACTIVE | Noted: 2022-05-14

## 2022-05-14 LAB
ANION GAP SERPL CALCULATED.3IONS-SCNC: 5 MMOL/L (ref 4–13)
BASOPHILS # BLD AUTO: 0.05 THOUSANDS/ΜL (ref 0–0.1)
BASOPHILS NFR BLD AUTO: 1 % (ref 0–1)
BUN SERPL-MCNC: 11 MG/DL (ref 5–25)
CALCIUM SERPL-MCNC: 8.9 MG/DL (ref 8.3–10.1)
CHLORIDE SERPL-SCNC: 105 MMOL/L (ref 100–108)
CO2 SERPL-SCNC: 31 MMOL/L (ref 21–32)
CREAT SERPL-MCNC: 0.79 MG/DL (ref 0.6–1.3)
EOSINOPHIL # BLD AUTO: 0.22 THOUSAND/ΜL (ref 0–0.61)
EOSINOPHIL NFR BLD AUTO: 2 % (ref 0–6)
GFR SERPL CREATININE-BSD FRML MDRD: 79 ML/MIN/1.73SQ M
GLUCOSE SERPL-MCNC: 90 MG/DL (ref 65–140)
HCT VFR BLD AUTO: 25.8 % (ref 34.8–46.1)
HGB BLD-MCNC: 7.6 G/DL (ref 11.5–15.4)
IMM GRANULOCYTES # BLD AUTO: 0.07 THOUSAND/UL (ref 0–0.2)
IMM GRANULOCYTES NFR BLD AUTO: 1 % (ref 0–2)
LYMPHOCYTES # BLD AUTO: 1.58 THOUSANDS/ΜL (ref 0.6–4.47)
LYMPHOCYTES NFR BLD AUTO: 16 % (ref 14–44)
MAGNESIUM SERPL-MCNC: 2.4 MG/DL (ref 1.6–2.6)
MCH RBC QN AUTO: 21.5 PG (ref 26.8–34.3)
MCHC RBC AUTO-ENTMCNC: 29.5 G/DL (ref 31.4–37.4)
MCV RBC AUTO: 73 FL (ref 82–98)
MONOCYTES # BLD AUTO: 0.91 THOUSAND/ΜL (ref 0.17–1.22)
MONOCYTES NFR BLD AUTO: 9 % (ref 4–12)
NEUTROPHILS # BLD AUTO: 7.22 THOUSANDS/ΜL (ref 1.85–7.62)
NEUTS SEG NFR BLD AUTO: 71 % (ref 43–75)
NRBC BLD AUTO-RTO: 2 /100 WBCS
PHOSPHATE SERPL-MCNC: 4.1 MG/DL (ref 2.3–4.1)
PLATELET # BLD AUTO: 386 THOUSANDS/UL (ref 149–390)
POTASSIUM SERPL-SCNC: 3.2 MMOL/L (ref 3.5–5.3)
RBC # BLD AUTO: 3.53 MILLION/UL (ref 3.81–5.12)
SODIUM SERPL-SCNC: 141 MMOL/L (ref 136–145)
WBC # BLD AUTO: 10.05 THOUSAND/UL (ref 4.31–10.16)

## 2022-05-14 PROCEDURE — 83735 ASSAY OF MAGNESIUM: CPT

## 2022-05-14 PROCEDURE — 85025 COMPLETE CBC W/AUTO DIFF WBC: CPT

## 2022-05-14 PROCEDURE — 84100 ASSAY OF PHOSPHORUS: CPT

## 2022-05-14 PROCEDURE — 99238 HOSP IP/OBS DSCHRG MGMT 30/<: CPT | Performed by: INTERNAL MEDICINE

## 2022-05-14 PROCEDURE — 80048 BASIC METABOLIC PNL TOTAL CA: CPT

## 2022-05-14 RX ORDER — LOSARTAN POTASSIUM 50 MG/1
50 TABLET ORAL DAILY
Qty: 30 TABLET | Refills: 0 | Status: SHIPPED | OUTPATIENT
Start: 2022-05-15 | End: 2022-05-25

## 2022-05-14 RX ORDER — FUROSEMIDE 20 MG/1
20 TABLET ORAL DAILY
Qty: 30 TABLET | Refills: 0 | Status: SHIPPED | OUTPATIENT
Start: 2022-05-14 | End: 2022-05-25 | Stop reason: SDUPTHER

## 2022-05-14 RX ORDER — FERROUS SULFATE TAB EC 324 MG (65 MG FE EQUIVALENT) 324 (65 FE) MG
324 TABLET DELAYED RESPONSE ORAL
Qty: 60 TABLET | Refills: 0 | Status: SHIPPED | OUTPATIENT
Start: 2022-05-14 | End: 2022-05-25 | Stop reason: SDUPTHER

## 2022-05-14 RX ORDER — POTASSIUM CHLORIDE 20 MEQ/1
40 TABLET, EXTENDED RELEASE ORAL ONCE
Status: COMPLETED | OUTPATIENT
Start: 2022-05-14 | End: 2022-05-14

## 2022-05-14 RX ADMIN — ENOXAPARIN SODIUM 40 MG: 40 INJECTION SUBCUTANEOUS at 09:14

## 2022-05-14 RX ADMIN — LOSARTAN POTASSIUM 50 MG: 50 TABLET, FILM COATED ORAL at 09:14

## 2022-05-14 RX ADMIN — FUROSEMIDE 40 MG: 10 INJECTION, SOLUTION INTRAMUSCULAR; INTRAVENOUS at 09:14

## 2022-05-14 RX ADMIN — PANTOPRAZOLE SODIUM 40 MG: 40 TABLET, DELAYED RELEASE ORAL at 05:29

## 2022-05-14 RX ADMIN — POTASSIUM CHLORIDE 40 MEQ: 1500 TABLET, EXTENDED RELEASE ORAL at 09:14

## 2022-05-14 RX ADMIN — IRON SUCROSE 200 MG: 20 INJECTION, SOLUTION INTRAVENOUS at 09:14

## 2022-05-14 NOTE — PLAN OF CARE
Problem: MOBILITY - ADULT  Goal: Maintain or return to baseline ADL function  Description: INTERVENTIONS:  -  Assess patient's ability to carry out ADLs; assess patient's baseline for ADL function and identify physical deficits which impact ability to perform ADLs (bathing, care of mouth/teeth, toileting, grooming, dressing, etc )  - Assess/evaluate cause of self-care deficits   - Assess range of motion  - Assess patient's mobility; develop plan if impaired  - Assess patient's need for assistive devices and provide as appropriate  - Encourage maximum independence but intervene and supervise when necessary  - Involve family in performance of ADLs  - Assess for home care needs following discharge   - Consider OT consult to assist with ADL evaluation and planning for discharge  - Provide patient education as appropriate  Outcome: Progressing  Goal: Maintains/Returns to pre admission functional level  Description: INTERVENTIONS:  - Perform BMAT or MOVE assessment daily    - Set and communicate daily mobility goal to care team and patient/family/caregiver     - Collaborate with rehabilitation services on mobility goals if consulted  - Out of bed for toileting  - Record patient progress and toleration of activity level   Outcome: Progressing     Problem: PAIN - ADULT  Goal: Verbalizes/displays adequate comfort level or baseline comfort level  Description: Interventions:  - Encourage patient to monitor pain and request assistance  - Assess pain using appropriate pain scale  - Administer analgesics based on type and severity of pain and evaluate response  - Implement non-pharmacological measures as appropriate and evaluate response  - Consider cultural and social influences on pain and pain management  - Notify physician/advanced practitioner if interventions unsuccessful or patient reports new pain  Outcome: Progressing     Problem: INFECTION - ADULT  Goal: Absence or prevention of progression during hospitalization  Description: INTERVENTIONS:  - Assess and monitor for signs and symptoms of infection  - Monitor lab/diagnostic results  - Monitor all insertion sites, i e  indwelling lines, tubes, and drains  - Monitor endotracheal if appropriate and nasal secretions for changes in amount and color  - [place] appropriate cooling/warming therapies per order  - Administer medications as ordered  - Instruct and encourage patient and family to use good hand hygiene technique  - Identify and instruct in appropriate isolation precautions for identified infection/condition  Outcome: Progressing  Goal: Absence of fever/infection during neutropenic period  Description: INTERVENTIONS:  - Monitor WBC    Outcome: Progressing     Problem: SAFETY ADULT  Goal: Maintain or return to baseline ADL function  Description: INTERVENTIONS:  -  Assess patient's ability to carry out ADLs; assess patient's baseline for ADL function and identify physical deficits which impact ability to perform ADLs (bathing, care of mouth/teeth, toileting, grooming, dressing, etc )  - Assess/evaluate cause of self-care deficits   - Assess range of motion  - Assess patient's mobility; develop plan if impaired  - Assess patient's need for assistive devices and provide as appropriate  - Encourage maximum independence but intervene and supervise when necessary  - Involve family in performance of ADLs  - Assess for home care needs following discharge   - Consider OT consult to assist with ADL evaluation and planning for discharge  - Provide patient education as appropriate  Outcome: Progressing  Goal: Maintains/Returns to pre admission functional level  Description: INTERVENTIONS:  - Perform BMAT or MOVE assessment daily    - Set and communicate daily mobility goal to care team and patient/family/caregiver     - Collaborate with rehabilitation services on mobility goals if consulted  - Out of bed for toileting  - Record patient progress and toleration of activity level   Outcome: Progressing  Goal: Patient will remain free of falls  Description: INTERVENTIONS:  - Educate patient/family on patient safety including physical limitations  - Instruct patient to call for assistance with activity   - Consult OT/PT to assist with strengthening/mobility   - Keep Call bell within reach  - Keep bed low and locked with side rails adjusted as appropriate  - Keep care items and personal belongings within reach  - Initiate and maintain comfort rounds  - Make Fall Risk Sign visible to staff  - Apply yellow socks and bracelet for high fall risk patients  - Consider moving patient to room near nurses station  Outcome: Progressing     Problem: DISCHARGE PLANNING  Goal: Discharge to home or other facility with appropriate resources  Description: INTERVENTIONS:  - Identify barriers to discharge w/patient and caregiver  - Arrange for needed discharge resources and transportation as appropriate  - Identify discharge learning needs (meds, wound care, etc )  - Arrange for interpretive services to assist at discharge as needed  - Refer to Case Management Department for coordinating discharge planning if the patient needs post-hospital services based on physician/advanced practitioner order or complex needs related to functional status, cognitive ability, or social support system  Outcome: Progressing     Problem: Knowledge Deficit  Goal: Patient/family/caregiver demonstrates understanding of disease process, treatment plan, medications, and discharge instructions  Description: Complete learning assessment and assess knowledge base    Interventions:  - Provide teaching at level of understanding  - Provide teaching via preferred learning methods  Outcome: Progressing     Problem: Potential for Falls  Goal: Patient will remain free of falls  Description: INTERVENTIONS:  - Educate patient/family on patient safety including physical limitations  - Instruct patient to call for assistance with activity   - Consult OT/PT to assist with strengthening/mobility   - Keep Call bell within reach  - Keep bed low and locked with side rails adjusted as appropriate  - Keep care items and personal belongings within reach  - Initiate and maintain comfort rounds  - Make Fall Risk Sign visible to staff  - Apply yellow socks and bracelet for high fall risk patients  - Consider moving patient to room near nurses station  Outcome: Progressing

## 2022-05-14 NOTE — ASSESSMENT & PLAN NOTE
2 subcentimeter polyps in the ascending and sigmoid colon removed with cold snare  · Follow-up pathology  · Repeat colonoscopy in 7 years due to personal history of polyps

## 2022-05-14 NOTE — DISCHARGE SUMMARY
INTERNAL MEDICINE RESIDENCY DISCHARGE SUMMARY     Dick Leong   61 y o  female  MRN: [id number]  Room/Bed: /MS [address] MED SURG 5   Encounter: [id number]    Principal Problem:    Symptomatic anemia  Active Problems:    FALLON (dyspnea on exertion)    High output heart failure (HCC)    Hypertension    Colon polyps      * Symptomatic anemia  Assessment & Plan  Hemoglobin 3 2 and MCV 58  Iron panel consistent with severe iron deficiency with single digit ferritin  Denies hematuria, hematochezia, NSAID use, hematemesis, cancer history  Never had colonoscopy  Does endorse single episode of black stool several weeks ago, has not recurred  Denies any history of abnormal vaginal bleeding  Patient does endorse decades long history of anemia, although she denies having symptoms until recently  She does not know what her baseline hemoglobin is  Patient denies any family history of anemia  Family is of [place]  descent  Received total 4 units packed RBCs with appropriate response  Status post EGD/colonoscopy on 5/13, no source of anemia identified  No intra-abdominal bleed seen on CT  Venofer x1 on 05/14    Plan  · Will discharge on ferrous sulfate  · Outpatient referral to Hematology provided, patient is motivated to follow-up  · Patient to establish care with PCP  · Repeat CBC in 1 week  Colon polyps  Assessment & Plan  2 subcentimeter polyps in the ascending and sigmoid colon removed with cold snare  · Follow-up pathology  · Repeat colonoscopy in 7 years due to personal history of polyps        Hypertension  Assessment & Plan  Initially presenting with blood pressure of 187/8, patient previously on antihypertensives   Blood pressure improved    Plan  · Losartan 50 mg daily      High output heart failure (HCC)  Assessment & Plan  Wt Readings from Last 3 Encounters:   05/14/22 106 kg (234 lb 6 4 oz)   05/11/22 109 kg (240 lb) Presents dyspnea on exertion, bilateral leg and abdominal swelling, PND, oliguria for the past 2 weeks  Patient has increased her fluid intake due to a dry cough  Hypertensive (177/77) and tachycardic (113) on admission, saturating adequately on room air  Bilateral 2+ pitting leg edema, JVD  In the setting of severe anemia with hemoglobin 3 2, , troponin 10  PA and lateral chest x-ray shows small bilateral pleural effusions left worse than right with bibasilar atelectasis  Echocardiogram demonstrates no heart failure, LVEF 65%, hyperdynamic  Patient responding appropriately Lasix, lower extremity edema improving  Plan  · Will discharge on 20 mg of Lasix oral daily  FALLON (dyspnea on exertion)  Assessment & Plan  In the setting of hemoglobin 3 2  Small bilateral pleural effusions but no vascular congestion or airspace opacities on chest x-ray  Likely secondary to symptomatic anemia      DETAILS OF HOSPITAL COURSE     61years old female with no known past medical history, no outpatient follow-up presented to the ED on 05/11 with worsening shortness of breath and leg swelling  Her symptoms were progressive over a few months, recently worsening over the past 2 weeks  She also noticed approximately around 20 lb weight gain and lower extremity edema  No orthopnea or PND  No fevers, chills, abdominal pain, hematemesis, melena, hematochezia  No excess NSAID use  She is postmenopausal and had no postmenopausal bleeding  Patient was found to have elevated blood pressure on admission  She was saturating well on room air  Her hemoglobin on admission was 3 2, MCV of 58 and evidence of severe iron deficiency with single digit ferritin  She had normal troponin and normal EKG  Chest x-ray showed small bilateral effusions  CT scan of the abdomen and pelvis showed no evidence of intra-abdominal bleed    Patient received 4 units of packed RBCs with appropriate response and 1 dose of Venofer  Her hemoglobin improved to above 7 and her symptoms has much improved  She underwent EGD and colonoscopy, EGD was unremarkable, biopsies were obtained for H pylori and celiac disease  Colonoscopy showed 2 polyps that were removed and sent for pathology  No source of bleeding was identified  she will need to follow-up with hematology as outpatient which she was agreeable to  Patient received echocardiogram which was largely unremarkable  She was diuresed with Lasix with significant improvement in lower extremity edema and weight  She will be discharged on 20 mg lasix  He was deemed stable for discharge on 05/14  The above plan was discussed with the patient's at bedside in details  She was aware of the diagnosis and the importance of outpatient follow-up  On the day of discharge, she was seen and examined at bedside  She felt significantly improved  She offered no acute complaints  /71 (BP Location: Right arm)   Pulse 84   Temp 98 4 °F (36 9 °C) (Oral)   Resp 16   Ht 5' 11" (1 803 m)   Wt 106 kg (234 lb 6 4 oz)   SpO2 100%   BMI 32 69 kg/m²      Physical Exam  Vitals and nursing note reviewed  Constitutional:       General: She is not in acute distress  Appearance: She is well-developed  HENT:      Head: Normocephalic and atraumatic  Eyes:      Conjunctiva/sclera: Conjunctivae normal    Cardiovascular:      Rate and Rhythm: Normal rate and regular rhythm  Heart sounds: No murmur heard  Pulmonary:      Effort: Pulmonary effort is normal  No respiratory distress  Breath sounds: Normal breath sounds  Abdominal:      Palpations: Abdomen is soft  Tenderness: There is no abdominal tenderness  Musculoskeletal:      Cervical back: Neck supple  Right lower leg: Edema present  Left lower leg: Edema present  Skin:     General: Skin is warm and dry  Neurological:      Mental Status: She is alert             DISCHARGE INFORMATION     PCP at Discharge: No PCP, patient was provided information to establish care  Admitting Provider: Ry Cueva MD  Admission Date: 5/11/2022    Discharge Provider: Ry Cueva MD  Discharge Date:  05/14/2022    Discharge Disposition: home   Discharge Condition: good  Discharge with Lines: no    Discharge Diet: regular diet  Activity Restrictions: none  Test Results Pending at Discharge:  Duodenal, stomach and colon biopsy pathology  Discharge Diagnoses:  Principal Problem:    Symptomatic anemia  Active Problems:    FALLON (dyspnea on exertion)    High output heart failure (HCC)    Hypertension    Colon polyps  Resolved Problems:    * No resolved hospital problems  *      Consulting Providers:  Gastroenterology    Diagnostic & Therapeutic Procedures Performed:  EGD    Result Date: 5/13/2022  Impression: Normal esophagus Large hiatal hernia without Adi's erosion Normal stomach and duodenum Biopsies taken for H pylori and celiac disease RECOMMENDATION: Await pathology results Proceed with colonoscopy for evaluation of anemia  ATTENDING ATTESTATION:  I was present throughout the entire procedure from insertion to complete withdrawal of the scope  I performed all interventions myself or oversaw the fellow  Lorri Tomlinson MD     Colonoscopy    Result Date: 5/13/2022  Impression: 2 subcentimeter polyps removed with cold snare RECOMMENDATION: Repeat colonoscopy in 7 years due to a personal history of colon polyps No source of anemia identified Recommend hematology evaluation We will sign off ATTENDING ATTESTATION:  I was present throughout the entire procedure from insertion to complete withdrawal of the scope  I performed all interventions myself or oversaw the fellow  Lorri Tomlinson MD     CT abdomen pelvis wo contrast    Result Date: 5/12/2022  Impression: No acute intra-abdominal pathology to account for anemia  Specifically, no intraperitoneal or retroperitoneal hematoma  Hiatal hernia    The entire gastric fundus is contained within the hernia sac and the gastric wall is somewhat thickened  Trace left greater than right pleural effusions  Mild body wall edema  Cholelithiasis  Few scattered colonic diverticula  Workstation performed: [id number]     XR chest 2 views    Result Date: 5/11/2022  Impression: Small bilateral pleural effusions (left worse than right) with bibasilar atelectasis  Workstation performed: [id number]       Code Status: Level 1 - Full Code  Advance Directive & Living Will: <no information>  Power of :    POLST:      Medications:  Current Discharge Medication List        Current Discharge Medication List      START taking these medications    Details   ferrous sulfate 324 (65 Fe) mg Take 1 tablet (324 mg total) by mouth in the morning and 1 tablet (324 mg total) in the evening  Take before meals  Qty: 60 tablet, Refills: 0    Associated Diagnoses: Iron deficiency anemia      furosemide (LASIX) 20 mg tablet Take 1 tablet (20 mg total) by mouth in the morning  Qty: 30 tablet, Refills: 0    Associated Diagnoses: Lower extremity edema      losartan (COZAAR) 50 mg tablet Take 1 tablet (50 mg total) by mouth in the morning  Qty: 30 tablet, Refills: 0    Associated Diagnoses: Primary hypertension           Current Discharge Medication List          Allergies: Allergies   Allergen Reactions    Penicillins Hives    Shellfish-Derived Products - Food Allergy GI Intolerance    Latex Rash       FOLLOW-UP     PCP Outpatient Follow-up:  Patient will establish care with PCP    Consulting Providers Follow-up:  Referral provided to Hematology     Active Issues Requiring Follow-up:   Iron deficiency anemia, hypertension    Discharge Statement:   I spent 30 minutes minutes discharging the patient  This time was spent on the day of discharge  I had direct contact with the patient on the day of discharge  Additional documentation is required if more than 30 minutes were spent on discharge      Portions of the record may have been created with voice recognition software  Occasional wrong word or "sound a like" substitutions may have occurred due to the inherent limitations of voice recognition software    Read the chart carefully and recognize, using context, where substitutions have occurred     ==  Hilary Oneill MD  [address]  Internal Medicine Resident PGY-3

## 2022-05-16 ENCOUNTER — TELEPHONE (OUTPATIENT)
Dept: HEMATOLOGY ONCOLOGY | Facility: CLINIC | Age: 67
End: 2022-05-16

## 2022-05-16 ENCOUNTER — APPOINTMENT (OUTPATIENT)
Dept: LAB | Facility: CLINIC | Age: 67
End: 2022-05-16
Payer: COMMERCIAL

## 2022-05-16 DIAGNOSIS — D50.9 IRON DEFICIENCY ANEMIA: ICD-10-CM

## 2022-05-16 LAB
BASOPHILS # BLD AUTO: 0.08 THOUSANDS/ΜL (ref 0–0.1)
BASOPHILS NFR BLD AUTO: 1 % (ref 0–1)
EOSINOPHIL # BLD AUTO: 0.5 THOUSAND/ΜL (ref 0–0.61)
EOSINOPHIL NFR BLD AUTO: 7 % (ref 0–6)
HCT VFR BLD AUTO: 29.7 % (ref 34.8–46.1)
HGB BLD-MCNC: 8.7 G/DL (ref 11.5–15.4)
IMM GRANULOCYTES # BLD AUTO: 0.02 THOUSAND/UL (ref 0–0.2)
IMM GRANULOCYTES NFR BLD AUTO: 0 % (ref 0–2)
LYMPHOCYTES # BLD AUTO: 1.47 THOUSANDS/ΜL (ref 0.6–4.47)
LYMPHOCYTES NFR BLD AUTO: 19 % (ref 14–44)
MCH RBC QN AUTO: 22.2 PG (ref 26.8–34.3)
MCHC RBC AUTO-ENTMCNC: 29.3 G/DL (ref 31.4–37.4)
MCV RBC AUTO: 76 FL (ref 82–98)
MONOCYTES # BLD AUTO: 0.65 THOUSAND/ΜL (ref 0.17–1.22)
MONOCYTES NFR BLD AUTO: 8 % (ref 4–12)
NEUTROPHILS # BLD AUTO: 5.02 THOUSANDS/ΜL (ref 1.85–7.62)
NEUTS SEG NFR BLD AUTO: 65 % (ref 43–75)
NRBC BLD AUTO-RTO: 1 /100 WBCS
PLATELET # BLD AUTO: 437 THOUSANDS/UL (ref 149–390)
RBC # BLD AUTO: 3.92 MILLION/UL (ref 3.81–5.12)
WBC # BLD AUTO: 7.74 THOUSAND/UL (ref 4.31–10.16)

## 2022-05-16 PROCEDURE — 85025 COMPLETE CBC W/AUTO DIFF WBC: CPT

## 2022-05-16 PROCEDURE — 36415 COLL VENOUS BLD VENIPUNCTURE: CPT

## 2022-05-16 NOTE — TELEPHONE ENCOUNTER
New Patient Intake Form   Patient Details:    Steph Jama  1955    Appointment Information   Who is calling to schedule? patient   If not self, what is the caller's name? Please put name of RBC nurse as well  n/a   DID YOU CONFIRM INSURANCE WITH PATIENT? Yes patient is self pay, medicare A and B are supposed to be active in 2 weeks per the letter patient received on 5/14  Referring provider Dr Johnston Oppenheim   What is the diagnosis? Iron deficiency anemia     Is there a confirmed tissue diagnosis?   no   Is there a biopsy ordered or pending? Please specify dates   no     Is patient aware of diagnosis? yes   Have you had any imaging or labs done? If yes, where? (If imaging done outside of [facility], please remind patient to bring a disk ) Yes labs 5/14      If imaging done at outside facility, did you instruct patient to obtain discs and bring to visit? no   Have you been seen by another Oncologist/Hematologist?  If so, who and where? no   Are the records in [facility] or Care Everywhere? yes   Does the patient have records at another facility/hospital? no   If yes, Name of facility, city and state where facility is located  [facility] you [facility] and provide [facility] number? N/a   Preferred [place]   Is the patient willing to be seen by another provider? (This is for breast patients only) n/a     Did you send new patient paperwork?   Email or mail? n/a   Miscellaneous Information: appt on 5/27 @ 320 pm with Dr Minda Mina at [facility]

## 2022-05-17 LAB
EST. AVERAGE GLUCOSE BLD GHB EST-MCNC: 123 MG/DL
HBA1C MFR BLD: 5.9 %

## 2022-05-25 ENCOUNTER — OFFICE VISIT (OUTPATIENT)
Dept: FAMILY MEDICINE CLINIC | Facility: CLINIC | Age: 67
End: 2022-05-25

## 2022-05-25 VITALS
RESPIRATION RATE: 18 BRPM | HEART RATE: 75 BPM | DIASTOLIC BLOOD PRESSURE: 89 MMHG | SYSTOLIC BLOOD PRESSURE: 178 MMHG | HEIGHT: 71 IN | BODY MASS INDEX: 32.28 KG/M2 | WEIGHT: 230.6 LBS | TEMPERATURE: 98.1 F

## 2022-05-25 DIAGNOSIS — D50.9 IRON DEFICIENCY ANEMIA, UNSPECIFIED IRON DEFICIENCY ANEMIA TYPE: Primary | ICD-10-CM

## 2022-05-25 DIAGNOSIS — I10 PRIMARY HYPERTENSION: ICD-10-CM

## 2022-05-25 DIAGNOSIS — R60.0 LOWER EXTREMITY EDEMA: ICD-10-CM

## 2022-05-25 DIAGNOSIS — R06.00 DOE (DYSPNEA ON EXERTION): ICD-10-CM

## 2022-05-25 DIAGNOSIS — D50.9 IRON DEFICIENCY ANEMIA: ICD-10-CM

## 2022-05-25 PROCEDURE — 99213 OFFICE O/P EST LOW 20 MIN: CPT | Performed by: FAMILY MEDICINE

## 2022-05-25 RX ORDER — FERROUS SULFATE TAB EC 324 MG (65 MG FE EQUIVALENT) 324 (65 FE) MG
324 TABLET DELAYED RESPONSE ORAL
Qty: 60 TABLET | Refills: 0 | Status: SHIPPED | OUTPATIENT
Start: 2022-05-25 | End: 2022-05-27

## 2022-05-25 RX ORDER — FUROSEMIDE 20 MG/1
20 TABLET ORAL DAILY
Qty: 30 TABLET | Refills: 1 | Status: SHIPPED | OUTPATIENT
Start: 2022-05-25 | End: 2022-06-10 | Stop reason: SDUPTHER

## 2022-05-25 RX ORDER — LOSARTAN POTASSIUM 50 MG/1
100 TABLET ORAL DAILY
Qty: 60 TABLET | Refills: 0 | Status: SHIPPED | OUTPATIENT
Start: 2022-05-25 | End: 2022-07-22

## 2022-05-25 NOTE — PROGRESS NOTES
Assessment/Plan:    Hypertension  - current regimen:  Losartan 50 mg daily, Lasix 20 mg daily  - will increase to losartan 50 mg 2 tablets daily  - recommend limiting salt intake and diet changes  - encouraged exercise  - follow-up in 2 weeks with BP log    Iron deficiency anemia  Patient admitted from 05/11 to 5/15 for symptomatic anemia with hemoglobin of 3 2 an MCV of 58  Received 4 units of PRBC and Venofer x1 with appropriate improvement  No over source of bleeding was identified    - iron panel 5/11: iron 12, TIBC 467, ferritin 3  - EGD/colonoscopy 5/13:  Unremarkable  - last labs 5/16: Hgb 8 7  - referred to hematology, initial visit to be 5/27  - continue iron tablets 2 tabs daily at this time, will await Hematology input regarding any changes  -     FALLON (dyspnea on exertion)  - this has much improved since her hospitalization but she usually notices this more at the end of the day  - small bilateral pleural effusions noted during hospitalization, lungs clear on exam today; most likely secondary to to her severe anemia (last labs 5/16/22: Hgb 8 7)  - Echo 5/11: EF 65%, hyperdynamic  - expected to improve as her hemoglobin normalizes, will continue to monitor closely       Lower extremity edema  - +1 bilateral edema noted on exam today  - continue Lasix 20 mg daily  - recommend limiting salt intake and elevating legs  - will monitor closely         Problem List Items Addressed This Visit        Cardiovascular and Mediastinum    Hypertension     - current regimen:  Losartan 50 mg daily, Lasix 20 mg daily  - will increase to losartan 50 mg 2 tablets daily  - recommend limiting salt intake and diet changes  - encouraged exercise  - follow-up in 2 weeks with BP log           Relevant Medications    furosemide (LASIX) 20 mg tablet    losartan (COZAAR) 50 mg tablet       Other    FALLON (dyspnea on exertion)     - this has much improved since her hospitalization but she usually notices this more at the end of the day  - small bilateral pleural effusions noted during hospitalization, lungs clear on exam today; most likely secondary to to her severe anemia (last labs 5/16/22: Hgb 8 7)  - Echo 5/11: EF 65%, hyperdynamic  - expected to improve as her hemoglobin normalizes, will continue to monitor closely              Iron deficiency anemia - Primary     Patient admitted from 05/11 to 5/15 for symptomatic anemia with hemoglobin of 3 2 an MCV of 58  Received 4 units of PRBC and Venofer x1 with appropriate improvement  No over source of bleeding was identified  - iron panel 5/11: iron 12, TIBC 467, ferritin 3  - EGD/colonoscopy 5/13:  Unremarkable  - last labs 5/16: Hgb 8 7  - referred to hematology, initial visit to be 5/27  - continue iron tablets 2 tabs daily at this time, will await Hematology input regarding any changes  -            Relevant Medications    ferrous sulfate 324 (65 Fe) mg    Lower extremity edema     - +1 bilateral edema noted on exam today  - continue Lasix 20 mg daily  - recommend limiting salt intake and elevating legs  - will monitor closely           Relevant Medications    furosemide (LASIX) 20 mg tablet            Subjective:      Patient ID: Chava Tirado is a 61 y o  female  62 y/o F presents for new patient evaluation  PMHx significant for anemia  Recent hospitalization from 5/11 - 5/15 for symptomatic anemia with a hemoglobin of 3 2 an MCV of 58  Presenting symptoms were fatigue, SOB, and peripheral edema  Patient reports that she was symptomatic for several months but waited so long to go because she did not have insurance and was waiting until she turned 67 to go on Medicare  Iron panel was consistent with severe iron deficiency with single digit ferritin  Appears that she has had decades long history of anemia, unknown baseline hemoglobin  Patient given 4 units of pRBCs with appropriate response and Venofer x1 on 5/14  EGD/colonoscopy 5/13 was unremarkable    There is no identifiable source of bleeding noted  Echo was also completed due to her shortness of breath and peripheral edema to rule out an underlying CHF etiology  Echo was perfectly normal, no suspicion of CHF  Patient reports that her symptoms have significantly improved since receiving the blood in starting the iron tablets  Still endorses some SOB, worse at the end of the day but much improved from previous  She reports that she has worsening cramping of her arms and worsening shortness of breath and fatigue if she misses a dose of her iron  She has a follow-up appointment with Hematology on Friday, 5/27  Her peripheral edema has improved but she still notes swelling  She has been taking Lasix 20 mg daily as prescribed on discharge  She does note that she has been eating extra salt at home and has not really been putting her legs up when sitting  Patient was also found to have elevated blood pressure on recent admission and was started on losartan 50 mg  She has been compliant with medication at home  She did log about 3 days of blood pressures at home and then for job doing so because she was upset at the elevated pressures (140-160s/80s)  Today, she would like refill of her medications  She otherwise has no concerns or complaints  The following portions of the patient's history were reviewed and updated as appropriate: allergies, current medications, past family history, past medical history, past social history, past surgical history and problem list     Review of Systems   Constitutional: Negative for chills and fever  HENT: Negative for congestion and rhinorrhea  Eyes: Negative for visual disturbance  Respiratory: Positive for shortness of breath ( occasional, mostly worsened and of day)  Negative for cough  Cardiovascular: Positive for leg swelling (Bilateral)  Negative for chest pain and palpitations     Gastrointestinal: Negative for abdominal pain, constipation, diarrhea, nausea and vomiting  Genitourinary: Negative for dysuria  Musculoskeletal: Negative for arthralgias  Skin: Negative for rash  Neurological: Negative for dizziness, light-headedness and headaches  Objective:      BP (!) 178/89   Pulse 75   Temp 98 1 °F (36 7 °C) (Temporal)   Resp 18   Ht 5' 11" (1 803 m)   Wt 105 kg (230 lb 9 6 oz)   BMI 32 16 kg/m²          Physical Exam  Vitals reviewed  Constitutional:       General: She is not in acute distress  Appearance: Normal appearance  HENT:      Head: Normocephalic and atraumatic  Right Ear: External ear normal       Left Ear: External ear normal       Nose: Nose normal       Mouth/Throat:      Pharynx: Oropharynx is clear  Eyes:      Conjunctiva/sclera: Conjunctivae normal    Cardiovascular:      Rate and Rhythm: Normal rate and regular rhythm  Pulses: Normal pulses  Heart sounds: Normal heart sounds  Pulmonary:      Effort: Pulmonary effort is normal       Breath sounds: Normal breath sounds  Abdominal:      Palpations: Abdomen is soft  Musculoskeletal:      Cervical back: Neck supple  Right lower leg: Edema (1+) present  Left lower leg: Edema ( 1+) present  Skin:     General: Skin is warm  Capillary Refill: Capillary refill takes less than 2 seconds  Neurological:      Mental Status: She is alert and oriented to person, place, and time  Gait: Gait normal    Psychiatric:         Mood and Affect: Mood normal          Behavior: Behavior normal          Thought Content:  Thought content normal          Judgment: Judgment normal

## 2022-05-25 NOTE — ASSESSMENT & PLAN NOTE
Patient admitted from 05/11 to 5/15 for symptomatic anemia with hemoglobin of 3 2 an MCV of 58  Received 4 units of PRBC and Venofer x1 with appropriate improvement  No over source of bleeding was identified    - iron panel 5/11: iron 12, TIBC 467, ferritin 3  - EGD/colonoscopy 5/13:  Unremarkable  - last labs 5/16: Hgb 8 7  - referred to hematology, initial visit to be 5/27  - continue iron tablets 2 tabs daily at this time, will await Hematology input regarding any changes  -

## 2022-05-25 NOTE — ASSESSMENT & PLAN NOTE
- this has much improved since her hospitalization but she usually notices this more at the end of the day  - small bilateral pleural effusions noted during hospitalization, lungs clear on exam today; most likely secondary to to her severe anemia (last labs 5/16/22: Hgb 8 7)  - Echo 5/11: EF 65%, hyperdynamic  - expected to improve as her hemoglobin normalizes, will continue to monitor closely

## 2022-05-25 NOTE — ASSESSMENT & PLAN NOTE
- +1 bilateral edema noted on exam today  - continue Lasix 20 mg daily  - recommend limiting salt intake and elevating legs  - will monitor closely

## 2022-05-25 NOTE — ASSESSMENT & PLAN NOTE
- current regimen:  Losartan 50 mg daily, Lasix 20 mg daily  - will increase to losartan 50 mg 2 tablets daily  - recommend limiting salt intake and diet changes  - encouraged exercise  - follow-up in 2 weeks with BP log

## 2022-05-26 ENCOUNTER — TELEPHONE (OUTPATIENT)
Dept: HEMATOLOGY ONCOLOGY | Facility: CLINIC | Age: 67
End: 2022-05-26

## 2022-05-26 NOTE — TELEPHONE ENCOUNTER
5/26/22 Spoke with pt  And let her know that we have her referral to be seen tomorrow with Dr Abdi Smiley Alburnett

## 2022-05-26 NOTE — TELEPHONE ENCOUNTER
CALL RETURN FORM   Reason for patient call? patient is calling because she states she received an automated text saying she needs to bring her referral or she would be charged for the visit  I informed patient I see the referral on her chart from Dr Paul Zapata  She would like a call back as soon as possible to make sure she can still come tomorrow    Patient's primary oncologist?  Maisha Palomino [facility]   Name of person the patient was calling for? [facility] [place]   Any additional information to add, if applicable? Best call back number is [phone]   Informed patient that the message will be forwarded to the team and someone will get back to them as soon as possible    Did you relay this information to the patient?  yes

## 2022-05-27 ENCOUNTER — CONSULT (OUTPATIENT)
Dept: HEMATOLOGY ONCOLOGY | Facility: CLINIC | Age: 67
End: 2022-05-27
Payer: COMMERCIAL

## 2022-05-27 VITALS
DIASTOLIC BLOOD PRESSURE: 72 MMHG | RESPIRATION RATE: 16 BRPM | TEMPERATURE: 98.1 F | OXYGEN SATURATION: 99 % | SYSTOLIC BLOOD PRESSURE: 124 MMHG | HEART RATE: 70 BPM | WEIGHT: 230 LBS | HEIGHT: 71 IN | BODY MASS INDEX: 32.2 KG/M2

## 2022-05-27 DIAGNOSIS — Z12.31 SCREENING MAMMOGRAM, ENCOUNTER FOR: Primary | ICD-10-CM

## 2022-05-27 DIAGNOSIS — D50.9 IRON DEFICIENCY ANEMIA: ICD-10-CM

## 2022-05-27 PROCEDURE — 99244 OFF/OP CNSLTJ NEW/EST MOD 40: CPT | Performed by: INTERNAL MEDICINE

## 2022-05-27 RX ORDER — FERROUS SULFATE TAB EC 324 MG (65 MG FE EQUIVALENT) 324 (65 FE) MG
324 TABLET DELAYED RESPONSE ORAL
Qty: 90 TABLET | Refills: 3 | Status: SHIPPED | OUTPATIENT
Start: 2022-05-27

## 2022-05-29 NOTE — PROGRESS NOTES
Oncology Outpatient Consult Note  Elis Cruz 61 y o  female MRN: @ Encounter: [id number]        Date:  5/29/2022        CC:  Iron deficiency anemia      HPI:  Elis Cruz is seen for initial consultation 5/29/2022 regarding her iron deficiency anemia  When she was admitted to the hospital she had a hgb = 3 2 with a ferritin of 3  She got venofer and got 4 U prbcs  hgb on discharge was 8 7  She went to the hospital because she had severe FALLON and fatigue that has gradually worsened over time  She had an EGD and colonoscopy that did not show an obvious bleeding source  duodenal biospy did not show any evidence of sprue  She used to give blood regularly but noticed that she started to get turned away every other time because her hgb was too low  She has never had children  When she did get her periods in the past, they were heavy  She denies any BRBPR or melena  She was also a vegetarian for a few years in the past   Her other medical history is only remarkable for HTN  She is taking PO iron BID and is tolerating it well  She denies any famhx of cancer  No tob  She drink etoh infrequently  She is a retired   ROS: As stated in history of present illness otherwise her 14 point review of systems today was negative  ECOG PS: 0      Test Results:    Imaging: EGD    Result Date: 5/13/2022  Narrative: [address] [place] [address] Street: 5/13/22 PHYSICIAN(S): Attending: Michaelle Lamas MD Fellow: MD Jessie Kee MD INDICATION: Microcytic anemia POST-OP DIAGNOSIS: See the impression below  PREPROCEDURE: Informed consent was obtained for the procedure, including sedation  Risks of perforation, hemorrhage, adverse drug reaction and aspiration were discussed  The patient was placed in the left lateral decubitus position  Patient was explained about the risks and benefits of the procedure  Risks including but not limited to bleeding, infection, and perforation were explained in detail  Also explained about less than 100% sensitivity with the exam and other alternatives  DETAILS OF PROCEDURE: Patient was taken to the procedure room where a time out was performed to confirm correct patient and correct procedure  The patient underwent monitored anesthesia care, which was administered by an anesthesia professional  The patient's blood pressure, heart rate, level of consciousness, respirations, oxygen and ETCO2 were monitored throughout the procedure  The scope was introduced through the mouth and advanced to the second part of the duodenum  Retroflexion was performed in the fundus  Prior to the procedure, the patient's H  Pylori status was unknown  The patient experienced no blood loss  The procedure was not difficult  The patient tolerated the procedure well  There were no apparent complications  ANESTHESIA INFORMATION: ASA: II Anesthesia Type: IV Sedation with Anesthesia MEDICATIONS: No administrations occurring from 1430 to 1451 on 05/13/22 FINDINGS: The esophagus appeared normal  Large hiatal hernia - GE junction 34 cm from the incisors, diaphragmatic impression 43 cm from the incisors The stomach, duodenal bulb, 1st part of the duodenum and 2nd part of the duodenum appeared normal  Performed forceps biopsies to rule out H  pylori in the body of the stomach, incisura and antrum  Two biopsies taken from the antrum, two biopsies from the body and one biopsy taken from the incisura of the stomach   Rule out H pylori Performed 6 forceps biopsies to rule out celiac disease in the 1st part of the duodenum and 2nd part of the duodenum SPECIMENS: ID Type Source Tests Collected by Time Destination 1 : r/o CELIAC  Tissue Duodenum TISSUE EXAM Suzanne Ly MD 5/13/2022  2:47 PM  2 : r/o h pylori  Tissue Stomach TISSUE EXAM Suzanne Ly MD 5/13/2022  2:48 PM      Impression: Normal esophagus Large hiatal hernia without Adi's erosion Normal stomach and duodenum Biopsies taken for H pylori and celiac disease RECOMMENDATION: Await pathology results Proceed with colonoscopy for evaluation of anemia  ATTENDING ATTESTATION:  I was present throughout the entire procedure from insertion to complete withdrawal of the scope  I performed all interventions myself or oversaw the fellow  Lit Bashir MD     Colonoscopy    Result Date: 5/13/2022  Narrative: [address] [place] [address] [address] Street: 5/13/22 PHYSICIAN(S): Attending: Lit Bashir MD Fellow: MD Samara Young MD INDICATION: Microcytic anemia POST-OP DIAGNOSIS: See the impression below  HISTORY: Prior colonoscopy: No prior colonoscopy  BOWEL PREPARATION: Miralax/Magnesium Citrate; Golytely/Colyte/Trilyte PREPROCEDURE: Informed consent was obtained for the procedure, including sedation  Risks including but not limited to bleeding, infection, perforation, adverse drug reaction and aspiration were explained in detail  Also explained about less than 100% sensitivity with the exam and other alternatives  The patient was placed in the left lateral decubitus position  DETAILS OF PROCEDURE: Patient was taken to the procedure room where a time out was performed to confirm correct patient and correct procedure  The patient underwent monitored anesthesia care, which was administered by an anesthesia professional  The patient's blood pressure, heart rate, level of consciousness, oxygen, respirations and ETCO2 were monitored throughout the procedure  A digital rectal exam was performed  A perianal exam was performed  The scope was introduced through the anus and advanced to the terminal ileum  Retroflexion was performed in the rectum  The quality of bowel preparation was evaluated using the [facility] Bowel Preparation Scale with scores of: right colon = 2, transverse colon = 2, left colon = 2   The total BBPS score was 6  Bowel prep was adequate  The patient experienced no blood loss  The procedure was not difficult  The patient tolerated the procedure well  There were no apparent complications  ANESTHESIA INFORMATION: ASA: II Anesthesia Type: IV Sedation with Anesthesia MEDICATIONS: No administrations occurring from 1430 to 1530 on 05/13/22 FINDINGS: One sessile, adenomatous-appearing polyp measuring 5-9 mm in the ascending colon; completely removed en bloc by cold snare and retrieved specimen Adenomatous-appearing polyp measuring 5-9 mm in the sigmoid colon; completely removed en bloc by cold snare and retrieved specimen EVENTS: Procedure Events Event Event Time ENDO SCOPE OUT TIME 5/13/2022  2:50 PM ENDO CECUM REACHED 5/13/2022  3:07 PM ENDO SCOPE OUT TIME 5/13/2022  3:26 PM SPECIMENS: ID Type Source Tests Collected by Time Destination 1 : r/o CELIAC  Tissue Duodenum TISSUE EXAM Kulwinder Buckley MD 5/13/2022  2:47 PM  2 : r/o h pylori  Tissue Stomach TISSUE EXAM Kulwinder Buckley MD 5/13/2022  2:48 PM  3 : ascending colon polyp, cold snare  Tissue Polyp, Colorectal TISSUE EXAM Kulwinder Buckley MD 5/13/2022  3:12 PM  4 : sigmoid colon polyp, cold snare  Tissue Large Intestine, Sigmoid Colon TISSUE EXAM Kulwinder Buckley MD 5/13/2022  3:25 PM  EQUIPMENT: Colonoscope -[id number] ENDOCUFF VISION LRG GREEN ID [id number]     Impression: 2 subcentimeter polyps removed with cold snare RECOMMENDATION: Repeat colonoscopy in 7 years due to a personal history of colon polyps No source of anemia identified Recommend hematology evaluation We will sign off ATTENDING ATTESTATION:  I was present throughout the entire procedure from insertion to complete withdrawal of the scope  I performed all interventions myself or oversaw the fellow     Marc Ferguson MD     CT abdomen pelvis wo contrast    Result Date: 5/12/2022  Narrative: CT ABDOMEN AND PELVIS WITHOUT IV CONTRAST INDICATION:   Anemia Retroperitoneal hematoma suspected severe anemia unresponsive to fluids, r/o retroperitoneal hematoma  COMPARISON:  None  TECHNIQUE:  CT examination of the abdomen and pelvis was performed without intravenous contrast  This examination was performed without intravenous contrast in the context of the critical nationwide Omnipaque shortage  Axial, sagittal, and coronal 2D reformatted images were created from the source data and submitted for interpretation  Radiation dose length product (DLP) for this visit:  1148  37 mGy-cm   This examination, like all CT scans performed in the [facility], was performed utilizing techniques to minimize radiation dose exposure, including the use of iterative reconstruction and automated exposure control  Enteric contrast was administered  FINDINGS: ABDOMEN LOWER CHEST:  Trace bibasilar left greater than right pleural effusions  Hiatal hernia with the entirety of the gastric fundus identified in the hernia sac  Relatively low density of blood within the cardiac chambers notable on image 1 of series 2 in keeping with reported history of anemia  LIVER/BILIARY TREE:  Unremarkable  GALLBLADDER:  There are gallstone(s) within the gallbladder, without pericholecystic inflammatory changes  SPLEEN:  Unremarkable  PANCREAS:  Unremarkable  ADRENAL GLANDS:  Unremarkable  KIDNEYS/URETERS:  Unremarkable  No hydronephrosis  STOMACH AND BOWEL:  Few colonic diverticula noted  Hiatal hernia with thickening of the wall the stomach in the hernia sac  No bowel thickening or bowel obstruction  APPENDIX:  A normal appendix was visualized  ABDOMINOPELVIC CAVITY:  No ascites  No pneumoperitoneum  No lymphadenopathy  VESSELS:  Unremarkable for patient's age  PELVIS REPRODUCTIVE ORGANS:  Unremarkable for patient's age  URINARY BLADDER:  Unremarkable  ABDOMINAL WALL/INGUINAL REGIONS:  There is a small fat-containing umbilical hernia  Mild body wall edema  OSSEOUS STRUCTURES:  No acute fracture or destructive osseous lesion    Mild left convex curvature of lumbar spine, apex at L2  Impression: No acute intra-abdominal pathology to account for anemia  Specifically, no intraperitoneal or retroperitoneal hematoma  Hiatal hernia  The entire gastric fundus is contained within the hernia sac and the gastric wall is somewhat thickened  Trace left greater than right pleural effusions  Mild body wall edema  Cholelithiasis  Few scattered colonic diverticula  Workstation performed: [id number]     XR chest 2 views    Result Date: 5/11/2022  Narrative: CHEST INDICATION:   sob  Shortness of breath  COMPARISON:  None EXAM PERFORMED/VIEWS:  XR CHEST PA & LATERAL Images: 4 FINDINGS: Cardiomediastinal silhouette appears unremarkable  Bibasilar atelectasis (left worse than right)  Small bilateral pleural effusions (left worse than right)  No pneumothorax  Osseous structures appear within normal limits for patient age  Impression: Small bilateral pleural effusions (left worse than right) with bibasilar atelectasis  Workstation performed: [id number]     [address] [facility] bedside procedure    Result Date: 5/11/2022  Narrative: 1 2 [phone] [id number]  2 446 246 [id number] 1    Echo complete    Result Date: 5/11/2022  Narrative: Daniel Goddard  Left Ventricle: Left ventricular cavity size is normal  Wall thickness is normal  The left ventricular ejection fraction is 65%  Systolic function is vigorous  Wall motion is normal  Diastolic function is normal for age  Left atrial filling pressure is normal    Right Ventricle: Right ventricular cavity size is dilated  Systolic function is normal    Left Atrium: The atrium is mildly dilated    Right Atrium: The atrium is mildly dilated    Aortic Valve: There is no evidence of stenosis    Mitral Valve: There is trace regurgitation    Pericardium: There is a small pericardial effusion circumferential to the heart  There is no echocardiographic evidence of tamponade         Labs:   Lab Results   Component Value Date    WBC 7 74 05/16/2022    HGB 8 7 (L) 05/16/2022    HCT 29 7 (L) 05/16/2022    MCV 76 (L) 05/16/2022     (H) 05/16/2022     Lab Results   Component Value Date    K 3 2 (L) 05/14/2022     05/14/2022    CO2 31 05/14/2022    BUN 11 05/14/2022    CREATININE 0 79 05/14/2022    CALCIUM 8 9 05/14/2022    CORRECTEDCA 9 4 05/11/2022    AST 41 05/11/2022    ALT 55 05/11/2022    ALKPHOS 124 (H) 05/11/2022    EGFR 79 05/14/2022         No results found for: SPEP, UPEP    No results found for: PSA    No results found for: CEA    No results found for: AFP    Lab Results   Component Value Date    IRON 12 (L) 05/11/2022    TIBC 467 (H) 05/11/2022    FERRITIN 3 (L) 05/11/2022       Lab Results   Component Value Date    RDAGOYTI72 652 05/12/2022               Active Problems:   Patient Active Problem List   Diagnosis    FALLON (dyspnea on exertion)    Iron deficiency anemia    High output heart failure ([address] )    Hypertension    Colon polyps    Lower extremity edema       Past Medical History: No past medical history on file  Surgical History:   Past Surgical History:   Procedure Laterality Date    TONSILLECTOMY         Family History:  No family history on file  Cancer-related family history is not on file      Social History:   Social History     Socioeconomic History    Marital status: Single     Spouse name: Not on file    Number of children: Not on file    Years of education: Not on file    Highest education level: Not on file   Occupational History    Not on file   Tobacco Use    Smoking status: Never Smoker    Smokeless tobacco: Never Used   Substance and Sexual Activity    Alcohol use: Not on file    Drug use: Not on file    Sexual activity: Not on file   Other Topics Concern    Not on file   Social History Narrative    Not on file     Social Determinants of Health     Financial Resource Strain: Low Risk     Difficulty of Paying Living Expenses: Not hard at all   Food Insecurity: No Food Insecurity    Worried About Running Out of Food in the Last Year: Never true    Ran Out of Food in the Last Year: Never true   Transportation Needs: No Transportation Needs    Lack of Transportation (Medical): No    Lack of Transportation (Non-Medical): No   Physical Activity: Not on file   Stress: Stress Concern Present    Feeling of Stress : To some extent   Social Connections: Not on file   Intimate Partner Violence: Not on file   Housing Stability: [address] Unable to Pay for Housing in the Last Year: No    Number of [place] in the Last Year: 1    Unstable Housing in the Last Year: No       Current Medications:   Current Outpatient Medications   Medication Sig Dispense Refill    ferrous sulfate 324 (65 Fe) mg Take 1 tablet (324 mg total) by mouth daily before breakfast 90 tablet 3    furosemide (LASIX) 20 mg tablet Take 1 tablet (20 mg total) by mouth daily 30 tablet 1    losartan (COZAAR) 50 mg tablet Take 2 tablets (100 mg total) by mouth daily (Patient taking differently: Take 50 mg by mouth 2 (two) times a day) 60 tablet 0     No current facility-administered medications for this visit  Allergies: Allergies   Allergen Reactions    Penicillins Hives    Shellfish-Derived Products - Food Allergy GI Intolerance    Latex Rash         Physical Exam:    Body surface area is 2 23 meters squared  Wt Readings from Last 3 Encounters:   05/27/22 104 kg (230 lb)   05/25/22 105 kg (230 lb 9 6 oz)   05/14/22 106 kg (234 lb 6 4 oz)        Temp Readings from Last 3 Encounters:   05/27/22 98 1 °F (36 7 °C) (Temporal)   05/25/22 98 1 °F (36 7 °C) (Temporal)   05/14/22 98 4 °F (36 9 °C) (Oral)        BP Readings from Last 3 Encounters:   05/27/22 124/72   05/25/22 (!) 178/89   05/14/22 139/71         Pulse Readings from Last 3 Encounters:   05/27/22 70   05/25/22 75   05/14/22 84     @LASTSAO2(3)@    Physical Exam     Constitutional   General appearance: No acute distress, well appearing and well nourished      Eyes Conjunctiva and lids: No swelling, erythema or discharge  Pupils and irises: Equal, round and reactive to light  Ears, Nose, Mouth, and Throat   External inspection of ears and nose: Normal     Nasal mucosa, septum, and turbinates: Normal without edema or erythema  Oropharynx: Normal with no erythema, edema, exudate or lesions  Pulmonary   Respiratory effort: No increased work of breathing or signs of respiratory distress  Auscultation of lungs: Clear to auscultation  Cardiovascular   Palpation of heart: Normal PMI, no thrills  Auscultation of heart: Normal rate and rhythm, normal S1 and S2, without murmurs  Examination of extremities for edema and/or varicosities: Normal     Carotid pulses: Normal     Abdomen   Abdomen: Non-tender, no masses  Liver and spleen: No hepatomegaly or splenomegaly  Lymphatic   Palpation of lymph nodes in neck: No lymphadenopathy  Musculoskeletal   Gait and station: Normal     Digits and nails: Normal without clubbing or cyanosis  Inspection/palpation of joints, bones, and muscles: Normal     Skin   Skin and subcutaneous tissue: Normal without rashes or lesions  Neurologic   Cranial nerves: Cranial nerves 2-12 intact  Sensation: No sensory loss  Psychiatric   Orientation to person, place, and time: Normal     Mood and affect: Normal           Assessment/ Plan:  62 yo female with iron deficiency anemia  I suspect that this happened slowly over time  She is going to follow up with GI and it would be reasonable to do a capsule study  There was no obvious bleeding source found on EGD/colonoscopy  I am going to have her get a new set of iron studies and then will replace with IV iron as needed  I asked her to take her iron pill once a day on an empty stomach  I will call her with the lab results and we will decide on follow up  I spent 45 minutes in chart review, face to face counseling, coordination of care, and documentation

## 2022-05-31 NOTE — UTILIZATION REVIEW
URGENT/EMERGENT  INPATIENT/SPU AUTHORIZATION REQUEST    Date: 05/31/22            # Pages in this Request:     X New Request   Additional Information for PA#:     Office Contact Name:  Annamarie Benny Title: Utilization Review, Stephanie Nurse     Phone: [phone]  Ext  Availability (Date/Time): Wednesday - Friday 8 am- 4 pm    x Inpatient Review  SPU Review        Current       x Late Pick-up   · How your facility was first notified of the Late Pick-up: Paths Letter  · When your facility was first notified of the Late Pick-up (date): 5/27/2022         RECIPIENT INFORMATION    Recipient ID#: [id number]   Recipient Name: Humera Eng      YOB: 1955  61 y o  Recipient Alias:     Gender:   Male X Female Medicaid Eligibility ([address] [place] [address]): INSURANCE INFORMATION    (All other private or governmental health insurance benefits must be utilized prior to billing the [place] Program)    Was this admission the result of an MVA, other accident, assault, injury, fall, gunshot, bite etc ? Yes x No                   If yes, provide a brief description of the incident  Does the recipient have other insurance coverage? Yes x No        Insurance Company Name/Policy #      Did that insurance pay on this claim? Yes  No        Did that insurance deny this claim? Yes  No    If yes, reason for denial:      Does the recipient have Medicare? Yes x No        Did Medicare exhaust prior to this admission? Yes  No        Did Medicare partially pay this claim? Yes  No        Did that insurance deny this claim? Yes  No    If yes, reason for denial:          Was the recipient a prisoner at the time of admission?   Yes x No            PROVIDER INFORMATION    Hospital Name: [facility] Provider ID#: [id number]    [address] [place] [address] Physician Name: Rafael Soto Provider ID#: [id number]        ADMISSION INFORMATION    Type of Admission: (please choose one)    X ED      Direct    If yes, from where?     Transfer    If yes, transferring hospital (inpatient, rehab, or psych) Provider Name/Provider ID#: Admission Floor or Unit Type: Med Surg     Dates/Times:        ED Date/Time: 5/11/2022  1:35 PM        Observation Date/Time:         Admission Date/Time: 5/11/22  3:05 PM        Discharge or Transfer Date/Time: 5/14/2022  2:23 PM        DIAGNOSIS/PROCEDURE CODES    Primary Diagnosis Code/Primary Diagnosis Code description:  D50 9 Iron deficiency anemia, unspecified    I11 0 Hypertensive heart disease with heart failure   R06 00 Dyspnea, unspecified   D12 2 Benign neoplasm of ascending colon   Additional Diagnosis Code(s) and Description(s)-(up to three additional codes):    Procedure Code (one) and description:  [id number] Excision of Ascending Colon, Endo      CLINICAL INFORMATION - PRIOR ADMISSION ONLY    Is there a prior admission with a discharge date within 30 days of the date of this admission? X No (Proceed to the next section - "Clinical Information - General Review Checklist:)      Yes (Provide the following information)     Prior admission dates:    MA Prior Authorization Number:        Review Outcome:     Diagnosis Code(s)/Description:    Procedure Code/Description:    Findings:    Treatment:    Condition on Discharge:   Vitals:    Labs:   Imaging:   Medications: Follow-up Instructions:    Disposition:        CLINICAL INFORMATION - GENERAL REVIEW CHECKLIST    EMERGENCY DEPARTMENT: (Proceed to "ADMISSION" if Direct Admission)    Presenting Signs/Symptoms:  61 y o  female from home presented to the ED with worsening FALLON and LE swelling  No known PMH  Pt reports dry cough and mild breathlessness started last December after receiving Moderna booster and symptoms persisted since  Symptoms became severe the last 2 weeks  She is only able to walk 20 feet before stopping to rest and FALLON is associated with b/l shoulder pain  She gained 20 lbs in her legs and abdomen   D/t symptoms, unable to cook healthy meals and her mother has been cooking and has not had a healthy diet  In the Ed, /77,   CBC remarkable for hemoglobin 3 2, hematocrit 12 7, and CV 58, platelets 714   CMP remarkable for potassium 3 4  CXR showed small bilateral pleural effusions left worse than right with bibasilar atelectasis   On exam, pt is alert, Obese, +JVD, tachycardic, bibasilar rales heard, +2 b/l LE present  She received IV Lasix 40 mg x 1 with 1100 cc urine output after  2 U PRBC ordered          Plan: Inpatient admission for evaluation and treatment of acute CHF, microcytic anemia:  IV Lasix 40 mg b i d  Start Losartan 50 mg, metoprolol tartrate 12 5 mg b i d  K-Dur 20 mg  PRN hydralazine 5 mg q 6 hrs PRN for SBP > 170, labetalol 10 mg q 4 hrs for SBP > 180  Echo ordered  Check TSH  F/u repeat troponin  Strict I/O, daily weights  Cardiac diet, 1 5 L fluid restriction  Monitor on telemetry   Cardiology, heart failure consulted     Medication/treatment prior to arrival in the ED:    Past Medical History:    Clinical Exam:    Initial Vital Signs: (Temp, Pulse, Resp, and BP)   ED Triage Vitals   Temperature Pulse Respirations Blood Pressure SpO2   05/11/22 1339 05/11/22 1339 05/11/22 1339 05/11/22 1340 05/11/22 1339   98 1 °F (36 7 °C) (!) 113 22 (!) 177/77 100 %      Temp Source Heart Rate Source Patient Position - Orthostatic VS BP Location FiO2 (%)   05/11/22 1339 05/11/22 1339 05/11/22 1339 05/11/22 1339 --   Oral Monitor Lying Right arm       Pain Score       05/11/22 1339       No Pain           Pertinent Repeat Vital Signs: (include times they were obtained)  Date/Time Temp Pulse Resp BP MAP (mmHg) SpO2 O2 Device Patient Position - Orthostatic VS   05/12/22 1225 99 7 °F (37 6 °C) 74 12 139/86 -- -- -- --   05/12/22 12:24:34 99 7 °F (37 6 °C) 74 -- 139/86 104 97 % -- --   05/12/22 0905 -- 73 18 -- -- 95 % -- --   05/12/22 08:42:29 98 2 °F (36 8 °C) 80 -- 161/88 112 97 % -- --   05/12/22 0833 98 2 °F (36 8 °C) 74 12 161/88 -- 97 % None (Room air) --   05/12/22 08:11:26 98 2 °F (36 8 °C) 79 -- 162/89 113 97 % -- --   05/12/22 0750 98 2 °F (36 8 °C) 78 12 164/89 -- -- -- --   05/12/22 07:48:47 98 2 °F (36 8 °C) 76 -- 164/89 114 97 % -- --   05/12/22 07:48:34 98 2 °F (36 8 °C) 81 10 Abnormal  164/89 114 97 % -- --   05/12/22 05:05:10 98 4 °F (36 9 °C) 83 18 168/91 117 97 % -- --   05/12/22 0405 98 2 °F (36 8 °C) 77 18 161/81 -- -- -- --   05/12/22 0335 98 3 °F (36 8 °C) 78 18 168/79 -- -- -- --   05/12/22 0330 -- -- 18 -- -- -- -- --   05/12/22 03:15:30 98 1 °F (36 7 °C) 82 18 167/84 112 96 % -- --   05/12/22 03:10:19 98 2 °F (36 8 °C) 74 -- 169/88 115 96 % -- --   05/12/22 0310 -- -- 18 -- -- -- -- --   05/12/22 03:07:03 98 6 °F (37 °C) 74 -- 168/89 115 97 % -- --   05/12/22 0305 98 6 °F (37 °C) 76 18 168/89 -- -- -- --   05/12/22 02:46:38 98 6 °F (37 °C) 74 19 169/90 116 98 % -- --   05/12/22 02:23:06 98 1 °F (36 7 °C) 79 -- 172/85 Abnormal  114 97 % -- --   05/12/22 0221 98 1 °F (36 7 °C) -- 20 172/85 Abnormal  -- -- -- --   05/12/22 01:21:09 98 8 °F (37 1 °C) 81 -- 132/95 107 97 % -- --   05/12/22 0051 -- -- 18 -- -- -- -- --   05/12/22 00:50:53 98 7 °F (37 1 °C) 78 -- 124/68 87 96 % -- --   05/12/22 00:36:06 99 °F (37 2 °C) 84 -- 120/70 87 97 % -- --   05/12/22 0036 -- -- 18 -- -- -- -- --   05/12/22 00:26:09 98 8 °F (37 1 °C) 78 -- 118/72 87 99 % -- --   05/12/22 00:20:42 98 9 °F (37 2 °C) 75 -- 112/71 85 98 % -- --   05/12/22 0008 -- -- 18 -- -- -- -- --   05/11/22 23:56:22 98 1 °F (36 7 °C) 88 -- 113/68 83 97 % -- --   05/11/22 23:11:03 98 8 °F (37 1 °C) 89 -- 115/65 82 97 % -- --   05/11/22 22:19:05 98 8 °F (37 1 °C) 93 -- 107/65 79 97 % -- --   05/11/22 20:34:57 99 8 °F (37 7 °C) 94 -- 174/91 Abnormal  119 93 % -- Sitting   05/11/22 18:41:30 -- 92 -- 183/90 Abnormal  121 97 % -- --   05/11/22 1722 98 4 °F (36 9 °C) 91 18 173/77 Abnormal  -- 98 % None (Room air) --   05/11/22 1650 98 6 °F (37 °C) 92 20 167/77 -- 97 % None (Room air) --   05/11/22 1635 98 2 °F (36 8 °C) 92 18 175/79 Abnormal  -- 96 % None (Room air) --   05/11/22 1616 98 4 °F (36 9 °C) 88 20 171/77 Abnormal  -- 99 % None (Room air) --   05/11/22 1601 -- 97 22 172/79 Abnormal  -- 95 % None (Room air) Lying   05/11/22 1500 -- 96 23 Abnormal  175/72 Abnormal  104 100 % -- Lying   05/11/22 1445 -- 98 22 187/81 Abnormal  116 100 % None (Room air) Lying   05/11/22 1426 -- 100 22 182/80 Abnormal  -- 100 % None (Room air) Lying   05/11/22 1415 -- 96 24 Abnormal  -- -- 98 % -- --   05/11/22 1352 -- -- -- -- -- -- None (Room air) --   05/11/22 1345 -- 100 22 177/77 Abnormal  111 98 % -- Lying   05/11/22 1340 -- -- -- 177/77 Abnormal  -- -- -- Lying       Pertinent Sustained Findings: (include times they were obtained)    Weight in Kilograms:  05/12/22 114 kg (251 lb 3 2 oz)     Pertinent Labs (results):  Results from last 7 days   Lab Units 05/12/22 0524 05/12/22  0254 05/11/22 2305 05/11/22 2222 05/11/22  1358   WBC Thousand/uL 8 50  --   --   --  8 87   HEMOGLOBIN g/dL 6 5* 5 4* 4 1* 4 2* 3 2*   HEMATOCRIT % 22 6* 19 4* 15 5* 16 0* 12 7*   PLATELETS Thousands/uL 391*  --  444*  --  469*   NEUTROS ABS Thousands/µL 5 66  --   --   --  6 85           Results from last 7 days   Lab Units 05/11/22  1358   RETIC CT ABS   71,500   RETIC CT PCT % 3 19*             Results from last 7 days   Lab Units 05/12/22  0524 05/11/22  2305 05/11/22  1358   SODIUM mmol/L 139 140 139   POTASSIUM mmol/L 3 9 3 2* 3 4*   CHLORIDE mmol/L 108 107 109*   CO2 mmol/L 24 25 19*   ANION GAP mmol/L 7 8 11   BUN mg/dL 14 14 15   CREATININE mg/dL 0 80 1 01 0 90   EGFR ml/min/1 73sq m 78 58 67   CALCIUM mg/dL 8 7 8 5 8 7            Results from last 7 days   Lab Units 05/11/22  2305 05/11/22  1358   AST U/L 41 47*   ALT U/L 55 59   ALK PHOS U/L 124* 132*   TOTAL PROTEIN g/dL 6 2* 6 6   ALBUMIN g/dL 2 9* 3 0*   TOTAL BILIRUBIN mg/dL 0 81 0 82                 Results from last 7 days   Lab Units 05/12/22  0524 05/11/22  2305 05/11/22  1358   GLUCOSE RANDOM mg/dL 107 130 152*              Results from last 7 days   Lab Units 05/11/22  1735 05/11/22  1612 05/11/22  1358   HS TNI 0HR ng/L  --   --  10   HS TNI 2HR ng/L  --  11  --    HSTNI D2 ng/L  --  1  --    HS TNI 4HR ng/L 13  --   --    HSTNI D4 ng/L 3  --   --                    Results from last 7 days   Lab Units 05/12/22  0524   TSH 3RD GENERATON uIU/mL 1 100            Results from last 7 days   Lab Units 05/11/22  1358   NT-PRO BNP pg/mL 369*           Results from last 7 days   Lab Units 05/11/22  1358   FERRITIN ng/mL 3*               Results from last 7 days   Lab Units 05/12/22  0535   TOTAL COUNTED   100        Radiology (results):  CT abdomen pelvis wo contrast   Final Result by Chris Montes MD (05/12 1893)       No acute intra-abdominal pathology to account for anemia  Specifically, no intraperitoneal or retroperitoneal hematoma        Hiatal hernia  The entire gastric fundus is contained within the hernia sac and the gastric wall is somewhat thickened        Trace left greater than right pleural effusions  Mild body wall edema  Cholelithiasis  Few scattered colonic diverticula                XR chest 2 views   ED Interpretation by Hira Camacho DO (05/11 0569)   Abnormal   Chest x-ray interpreted me shows bilateral pleural effusions, left greater than right, trace pulmonary vascular congestion, no focal consolidation, no old available for comparison       Final Result by Lydia Aguirre MD (05/11 0116)       Small bilateral pleural effusions (left worse than right) with bibasilar atelectasis          EKG (results): Other tests (results):  05/11 ECHO:    Left Ventricle: Left ventricular cavity size is normal  Wall thickness is normal  The left ventricular ejection fraction is 65%  Systolic function is vigorous   Wall motion is normal  Diastolic function is normal for age  Erfaín Rushing atrial filling pressure is normal     Right Ventricle: Right ventricular cavity size is dilated  Systolic function is normal     Left Atrium: The atrium is mildly dilated    Right Atrium: The atrium is mildly dilated    Aortic Valve: There is no evidence of stenosis    Mitral Valve: There is trace regurgitation    Pericardium: There is a small pericardial effusion circumferential to the heart  There is no echocardiographic evidence of tamponade  Tests pending final results:    Treatment in the ED:   Medication Administration from 05/11/2022 1335 to 05/11/2022 1828       Date/Time Order Dose Route Action Comments     05/11/2022 1404 furosemide (LASIX) injection 40 mg 40 mg Intravenous Given      05/11/2022 1654 losartan (COZAAR) tablet 50 mg 50 mg Oral Given      05/11/2022 1639 enoxaparin (LOVENOX) subcutaneous injection 40 mg 40 mg Subcutaneous Given      05/11/2022 1639 potassium chloride (K-DUR,KLOR-CON) CR tablet 20 mEq 20 mEq Oral Given            Other treatments:      Change in condition while in the ED:     Response to ED Treatment:          OBSERVATION: (Proceed to "ADMISSION" if Direct Admission)    Orders written during the observation period  Meds Name, dose, route, time, how may doses given:  PRN Meds Name, dose, route, time, how many doses given within the first 24 hrs :  IVs Type, rate, and total amt  ordered/given:  Labs, imaging, other:  Consults and findings:    Test Results during the observation period  Pertinent Lab tests (dates/results):  Culture results (blood, urine, spinal, wound, respiratory, etc ):  Imaging tests (dates/results):  EKG (dates/results):   Other test (dates/results):  Tests pending (dates/results):    Surgical or Invasive Procedures during the observation period  Name of surgery/procedure:  Date & Time:  Patient Response:  Post-operative orders:  Operative Report/Findings:    Response to Treatment, Major Change in Condition, Major Charge in Treatment during the observation period          ADMISSION:    DIRECT Admissions Only:    · Presenting Signs/Symptoms:   ·   · Medication/treatment prior to arrival:  ·   · Past Medical History:  ·   · Clinical Exam on admission:  ·   · Vital Signs on admission: (Temp, Pulse, Resp, and BP)  ·   · Weight in kilograms:     ALL Admissions:    Admission Orders and Other Orders written within the first 24 hrs after admission    50 Hr telemetry monitoring  SCD  PT  Daily weights  I/O  NPO             Meds Name, dose, route, time, how may doses given:  bisacodyl, 20 mg, Oral, Once  [START ON 5/13/2022] bisacodyl, 20 mg, Oral, Once  enoxaparin, 40 mg, Subcutaneous, Daily  furosemide, 40 mg, Intravenous, BID  losartan, 50 mg, Oral, Daily  [START ON 5/13/2022] magnesium citrate, 296 mL, Oral, Once  polyethylene glycol, 4,000 mL, Oral, Once  polyethylene glycol, 17 g, Oral, Daily  potassium chloride, 20 mEq, Oral, Daily  senna, 1 tablet, Oral, Daily         PRN Meds Name, dose, route, time, how many doses given within the first 24 hrs :    albuterol, 2 puff, Inhalation, Q4H PRN 05/12 x 1  hydrALAZINE, 5 mg, Intravenous, Q6H PRN 05/11 x 1  labetalol, 10 mg, Intravenous, Q4H PRN  nitroglycerin, 0 4 mg, Sublingual, Q5 Min PRN         IVs Type, rate, and total amt  ordered/given:  Labs, imaging, other:      Consults and findings: Gastroenterology consult - 5/12 - Pt with profound iron deficiency anemia  On exam: positive pallor   She has never had colonoscopy  Differential is broad  Will do EGD/colonoscopy tomorrow  Pt is agreeable to this            Test Results after admission  Pertinent Lab tests (dates/results):  Culture results (blood, urine, spinal, wound, respiratory, etc ):  Imaging tests (dates/results):  EKG (dates/results):   Other test (dates/results):  Tests pending (dates/results):    Surgical or Invasive Procedures  Name of surgery/procedure: EGD - Colonoscopy   Date & Time: 5/13/22  Patient Response: tolerated   Post-operative orders: same Operative Report/Findings: no source of anemia  found     Response to Treatment, Major Change in Condition, Major Charge in Treatment anytime during admission     Hospital Course   61years old female with no known past medical history, no outpatient follow-up presented to the ED on 05/11 with worsening shortness of breath and leg swelling  Her symptoms were progressive over a few months, recently worsening over the past 2 weeks  She also noticed approximately around 20 lb weight gain and lower extremity edema  No orthopnea or PND  No fevers, chills, abdominal pain, hematemesis, melena, hematochezia  No excess NSAID use  She is postmenopausal and had no postmenopausal bleeding        Patient was found to have elevated blood pressure on admission  She was saturating well on room air  Her hemoglobin on admission was 3 2, MCV of 58 and evidence of severe iron deficiency with single digit ferritin  She had normal troponin and normal EKG  Chest x-ray showed small bilateral effusions  CT scan of the abdomen and pelvis showed no evidence of intra-abdominal bleed  Patient received 4 units of packed RBCs with appropriate response and 1 dose of Venofer  Her hemoglobin improved to above 7 and her symptoms has much improved  She underwent EGD and colonoscopy, EGD was unremarkable, biopsies were obtained for H pylori and celiac disease  Colonoscopy showed 2 polyps that were removed and sent for pathology  No source of bleeding was identified  she will need to follow-up with hematology as outpatient which she was agreeable to  Patient received echocardiogram which was largely unremarkable  She was diuresed with Lasix with significant improvement in lower extremity edema and weight  She will be discharged on 20 mg lasix  He was deemed stable for discharge on 05/14  The above plan was discussed with the patient's at bedside in details    She was aware of the diagnosis and the importance of outpatient follow-up      On the day of discharge, she was seen and examined at bedside  She felt significantly improved  She offered no acute complaints  Disposition on Discharge  Home, Rehab, SNF, LTC, Shelter, etc : Home/Self Care    Cease to Breathe (CTB)  If a patient expires during an admission, in addition to the above information, please include:    Summary/timeline of the patient's decline in condition:    Medications and treatment:    Patient response to treatment:    Date and time patient ceased to breathe:        Is there a Readmission that follows this admission? Yes X No    If yes, provide dates:          InterQual Review    InterQual Criteria Met: X Yes  No  N/A        Please include the InterQual Review, InterQual year/version used, and the criteria selected:   Created Using Review Status Review Entered   InterQual Connect In Primary 5/12/2022 15:21       Criteria Set Name - Subset   LOC:Acute Adult-Heart Failure      Criteria Review   REVIEW SUMMARY     [employer]® Review Status:  In Primary  Criteria Status: Acute Met  Day of review: Episode Day 1  Condition Specific: Yes        REVIEW DETAILS     Product: Izabel Walker Adult  Subset: Heart Failure        (Symptom or finding within 24h)     (Excludes PO medications unless noted)         [X] Select Day, One:          [X] Episode Day 1, One:              [X] ACUTE, One:                  [X] Heart failure, new onset and, All:                      [X] Symptom, >= One:                          [X] Dyspnea                      [X] Finding, >= One:                          [X] Rales (crackles) on physical examination                          [X] Edema of extremities                          [X] Jugular venous distention                      [X] Intervention, >= One:                          [X] Diuretic >= 1 dose        Version: [employer] 2022, Apr 2022 Release  InterQual® criteria (IQ) is confidential and proprietary information and is being provided to you solely as it pertains to the information requested  IQ may contain advanced clinical knowledge which we recommend you discuss with your physician upon disclosure to you  Use permitted by and subject to license with [employer] and/or one of its [place]  IQ reflects clinical interpretations and analyses and cannot alone either (a) resolve medical ambiguities of particular situations; or (b) provide the sole basis for definitive decisions  IQ is intended solely for use as screening guidelines with respect to medical appropriateness of healthcare services  All ultimate care decisions are strictly and solely the obligation and responsibility of your health care provider  © 2022 [employer] and/or one of its subsidiaries  All Rights Reserved  CPT® only © 5250-3543 [employer]  All Rights Reserved  PLEASE SUBMIT THE COMPLETED FORM TO THE DEPARTMENT OF HUMAN SERVICES - DIVISION OF CLINICAL  REVIEW VIA FAX [phone] or VIA E-MAIL TO [email or web address]    Signature: Emilia Egan Date:  05/31/22    Confidentiality Notice: The documents accompanying this telecopy may contain confidential information belonging to the sender  The information is intended only for the use of the individual named above  If you are not the intended recipient, you are hereby notified  That any disclosure, copying, distribution or taking of any telecopy is strictly prohibited

## 2022-06-06 ENCOUNTER — APPOINTMENT (OUTPATIENT)
Dept: LAB | Facility: CLINIC | Age: 67
End: 2022-06-06
Payer: COMMERCIAL

## 2022-06-06 LAB
BASOPHILS # BLD AUTO: 0.09 THOUSANDS/ΜL (ref 0–0.1)
BASOPHILS NFR BLD AUTO: 2 % (ref 0–1)
EOSINOPHIL # BLD AUTO: 0.53 THOUSAND/ΜL (ref 0–0.61)
EOSINOPHIL NFR BLD AUTO: 9 % (ref 0–6)
FERRITIN SERPL-MCNC: 32 NG/ML (ref 8–388)
HCT VFR BLD AUTO: 38.4 % (ref 34.8–46.1)
HGB BLD-MCNC: 11.1 G/DL (ref 11.5–15.4)
IMM GRANULOCYTES # BLD AUTO: 0.01 THOUSAND/UL (ref 0–0.2)
IMM GRANULOCYTES NFR BLD AUTO: 0 % (ref 0–2)
IRON SATN MFR SERPL: 15 % (ref 15–50)
IRON SERPL-MCNC: 58 UG/DL (ref 50–170)
LYMPHOCYTES # BLD AUTO: 1.57 THOUSANDS/ΜL (ref 0.6–4.47)
LYMPHOCYTES NFR BLD AUTO: 26 % (ref 14–44)
MCH RBC QN AUTO: 22.8 PG (ref 26.8–34.3)
MCHC RBC AUTO-ENTMCNC: 28.9 G/DL (ref 31.4–37.4)
MCV RBC AUTO: 79 FL (ref 82–98)
MONOCYTES # BLD AUTO: 0.62 THOUSAND/ΜL (ref 0.17–1.22)
MONOCYTES NFR BLD AUTO: 10 % (ref 4–12)
NEUTROPHILS # BLD AUTO: 3.31 THOUSANDS/ΜL (ref 1.85–7.62)
NEUTS SEG NFR BLD AUTO: 53 % (ref 43–75)
NRBC BLD AUTO-RTO: 0 /100 WBCS
PLATELET # BLD AUTO: 402 THOUSANDS/UL (ref 149–390)
PMV BLD AUTO: 10.2 FL (ref 8.9–12.7)
RBC # BLD AUTO: 4.87 MILLION/UL (ref 3.81–5.12)
TIBC SERPL-MCNC: 393 UG/DL (ref 250–450)
WBC # BLD AUTO: 6.13 THOUSAND/UL (ref 4.31–10.16)

## 2022-06-06 PROCEDURE — 83540 ASSAY OF IRON: CPT | Performed by: INTERNAL MEDICINE

## 2022-06-06 PROCEDURE — 83550 IRON BINDING TEST: CPT | Performed by: INTERNAL MEDICINE

## 2022-06-06 PROCEDURE — 82728 ASSAY OF FERRITIN: CPT | Performed by: INTERNAL MEDICINE

## 2022-06-06 PROCEDURE — 85025 COMPLETE CBC W/AUTO DIFF WBC: CPT | Performed by: INTERNAL MEDICINE

## 2022-06-06 PROCEDURE — 36415 COLL VENOUS BLD VENIPUNCTURE: CPT | Performed by: INTERNAL MEDICINE

## 2022-06-10 ENCOUNTER — OFFICE VISIT (OUTPATIENT)
Dept: FAMILY MEDICINE CLINIC | Facility: CLINIC | Age: 67
End: 2022-06-10

## 2022-06-10 VITALS
TEMPERATURE: 98 F | HEIGHT: 69 IN | BODY MASS INDEX: 33.44 KG/M2 | SYSTOLIC BLOOD PRESSURE: 157 MMHG | DIASTOLIC BLOOD PRESSURE: 86 MMHG | RESPIRATION RATE: 18 BRPM | OXYGEN SATURATION: 98 % | HEART RATE: 86 BPM | WEIGHT: 225.8 LBS

## 2022-06-10 DIAGNOSIS — R60.0 LOWER EXTREMITY EDEMA: ICD-10-CM

## 2022-06-10 DIAGNOSIS — K59.03 DRUG-INDUCED CONSTIPATION: ICD-10-CM

## 2022-06-10 DIAGNOSIS — D50.9 IRON DEFICIENCY ANEMIA, UNSPECIFIED IRON DEFICIENCY ANEMIA TYPE: ICD-10-CM

## 2022-06-10 DIAGNOSIS — I10 PRIMARY HYPERTENSION: Primary | ICD-10-CM

## 2022-06-10 PROCEDURE — 99214 OFFICE O/P EST MOD 30 MIN: CPT | Performed by: FAMILY MEDICINE

## 2022-06-10 RX ORDER — POLYETHYLENE GLYCOL 3350 17 G/17G
17 POWDER, FOR SOLUTION ORAL DAILY
Qty: 30 EACH | Refills: 0 | Status: SHIPPED | OUTPATIENT
Start: 2022-06-10

## 2022-06-10 RX ORDER — FUROSEMIDE 20 MG/1
40 TABLET ORAL DAILY
Qty: 30 TABLET | Refills: 2 | Status: SHIPPED | OUTPATIENT
Start: 2022-06-10

## 2022-06-10 NOTE — PROGRESS NOTES
Assessment/Plan:    Drug-induced constipation  Secondary to iron use  Can use MiraLax (sent in) in stool softeners as needed  Hypertension  - currently well controlled with home averages ranging from 100-120s/60-80s  - patient continues to have significant fluid edema, appears to be +1 bilaterally today  - will increase Lasix to 40 mg daily  - patient will continue on losartan 50 mg at night however if she finds that her SBP < 100, patient to hold losartan as to not risk hypotensive episodes which may potentially result in falls  - encouraged patient to continue eating a healthy diet and exercising; she does report a low appetite today but states that she is eating 3 balanced meals a day  - encouraged patient to elevate legs when sitting to help with edema  - f/u 6 weeks or sooner if needed      Iron deficiency anemia  Patient admitted from 05/11 to 5/15 for symptomatic anemia with hemoglobin of 3 2 an MCV of 58  Received 4 units of PRBC and Venofer x1 with appropriate improvement  No over source of bleeding was identified  - iron panel 5/11: iron 12, TIBC 467, ferritin 3   - EGD/colonoscopy 5/13:  Unremarkable  - last labs 6/6: Hgb 11 1, iron 58, ferritin 32, iron concentration 15, TIBC 393 > all levels improving considerbly  -follows with heme, last appointment 5/27 - pt to take iron 1 tab daily and to get weekly labs per hematologist  - while she continues to have fatigue and FALLON at times, suspect that this is a combination of low iron levels and deconditioning over the last several months   Expect that this will improve with time  -     Lower extremity edema  - +1 bilateral edema noted on exam today  - increase Lasix to 40 mg daily  - recommend limiting salt intake and elevating legs  - will monitor closely    Decreased appetite  - began over the last several months, patient states that she does not find any foods appealing  - states that she has tried stick  strict healthy diet in order to improve her lifestyle  - inform patient that the importance of everything is in moderation and that it is okay to eat out and try different foods or have sweets as long as its in moderation  - encourage her to continue to eat a balanced diet  - will monitor       Diagnoses and all orders for this visit:    Primary hypertension    Iron deficiency anemia, unspecified iron deficiency anemia type    Drug-induced constipation  -     polyethylene glycol (MIRALAX) 17 g packet; Take 17 g by mouth daily    Lower extremity edema  -     furosemide (LASIX) 20 mg tablet; Take 2 tablets (40 mg total) by mouth daily          Subjective:      Patient ID: Dick Leong is a 61 y o  female  60 y/o F presents for follow-up for iron deficiency anemia, hypertension  She reports that she is walking more but occasionally still feels dyspnea on exertion and significant fatigue  She saw Hematology 5/27 who decreased her iron supplementation to 1 tab a day  Her most recent labs on 06/06 showed that her hemoglobin as well as her iron panel has significantly improved from before  She does report decreased appetite today states that she does not really have anything that she likes to eat  She does report that she is eating at least 3 balanced meals a day  In regards to her blood pressure, her home blood pressures remained stable with ranging from 100-120s/60-80s  She continues to have peripheral edema  She is on Lasix 20 mg and losartan 50 mg  The following portions of the patient's history were reviewed and updated as appropriate: allergies, current medications, past family history, past medical history, past social history, past surgical history and problem list     Review of Systems   Constitutional: Positive for fatigue  Negative for chills and fever  HENT: Negative for congestion and rhinorrhea  Eyes: Negative for visual disturbance  Respiratory: Positive for shortness of breath  Negative for cough      Cardiovascular: Positive for leg swelling (bilateral)  Negative for chest pain and palpitations  Gastrointestinal: Negative for abdominal pain, constipation, diarrhea, nausea and vomiting  Genitourinary: Negative for dysuria  Musculoskeletal: Negative for arthralgias  Skin: Negative for rash  Neurological: Negative for dizziness, light-headedness and headaches  Objective:      /86 (BP Location: Left arm, Patient Position: Sitting, Cuff Size: Large)   Pulse 86   Temp 98 °F (36 7 °C) (Temporal)   Resp 18   Ht 5' 9 1" (1 755 m)   Wt 102 kg (225 lb 12 8 oz)   SpO2 98%   BMI 33 25 kg/m²          Physical Exam  Vitals reviewed  Constitutional:       General: She is not in acute distress  Appearance: Normal appearance  HENT:      Head: Normocephalic and atraumatic  Right Ear: External ear normal       Left Ear: External ear normal       Nose: Nose normal       Mouth/Throat:      Pharynx: Oropharynx is clear  Eyes:      Conjunctiva/sclera: Conjunctivae normal    Cardiovascular:      Rate and Rhythm: Normal rate and regular rhythm  Pulses: Normal pulses  Heart sounds: Normal heart sounds  Pulmonary:      Effort: Pulmonary effort is normal       Breath sounds: Normal breath sounds  No rhonchi or rales  Abdominal:      Palpations: Abdomen is soft  Musculoskeletal:      Cervical back: Neck supple  Right lower leg: Edema (+1) present  Left lower leg: Edema (+1) present  Skin:     General: Skin is warm  Capillary Refill: Capillary refill takes less than 2 seconds  Neurological:      Mental Status: She is alert and oriented to person, place, and time  Gait: Gait normal    Psychiatric:         Mood and Affect: Mood normal          Behavior: Behavior normal          Thought Content:  Thought content normal          Judgment: Judgment normal

## 2022-06-10 NOTE — ASSESSMENT & PLAN NOTE
- currently well controlled with home averages ranging from 100-120s/60-80s  - patient continues to have significant fluid edema, appears to be +1 bilaterally today  - will increase Lasix to 40 mg daily  - patient will continue on losartan 50 mg at night however if she finds that her SBP < 100, patient to hold losartan as to not risk hypotensive episodes which may potentially result in falls  - encouraged patient to continue eating a healthy diet and exercising; she does report a low appetite today but states that she is eating 3 balanced meals a day  - encouraged patient to elevate legs when sitting to help with edema  - f/u 6 weeks or sooner if needed

## 2022-06-11 PROBLEM — R63.0 DECREASED APPETITE: Status: ACTIVE | Noted: 2022-06-11

## 2022-06-11 NOTE — ASSESSMENT & PLAN NOTE
- +1 bilateral edema noted on exam today  - increase Lasix to 40 mg daily  - recommend limiting salt intake and elevating legs  - will monitor closely

## 2022-06-11 NOTE — ASSESSMENT & PLAN NOTE
- began over the last several months, patient states that she does not find any foods appealing  - states that she has tried stick  strict healthy diet in order to improve her lifestyle  - inform patient that the importance of everything is in moderation and that it is okay to eat out and try different foods or have sweets as long as its in moderation  - encourage her to continue to eat a balanced diet  - will monitor

## 2022-06-11 NOTE — ASSESSMENT & PLAN NOTE
Patient admitted from 05/11 to 5/15 for symptomatic anemia with hemoglobin of 3 2 an MCV of 58  Received 4 units of PRBC and Venofer x1 with appropriate improvement  No over source of bleeding was identified  - iron panel 5/11: iron 12, TIBC 467, ferritin 3   - EGD/colonoscopy 5/13:  Unremarkable  - last labs 6/6: Hgb 11 1, iron 58, ferritin 32, iron concentration 15, TIBC 393 > all levels improving considerbly  -follows with heme, last appointment 5/27 - pt to take iron 1 tab daily and to get weekly labs per hematologist  - while she continues to have fatigue and FALLON at times, suspect that this is a combination of low iron levels and deconditioning over the last several months   Expect that this will improve with time  -

## 2022-06-20 ENCOUNTER — APPOINTMENT (OUTPATIENT)
Dept: LAB | Facility: CLINIC | Age: 67
End: 2022-06-20
Payer: MEDICARE

## 2022-06-22 ENCOUNTER — TELEPHONE (OUTPATIENT)
Dept: FAMILY MEDICINE CLINIC | Facility: CLINIC | Age: 67
End: 2022-06-22

## 2022-06-22 NOTE — TELEPHONE ENCOUNTER
Patient came in very stressed out since was approved for [facility] 6/11/22 but had office visit f/u BP on 6/10/22   I sent a message to Marcy Johnson

## 2022-07-05 ENCOUNTER — APPOINTMENT (OUTPATIENT)
Dept: LAB | Facility: CLINIC | Age: 67
End: 2022-07-05
Payer: MEDICARE

## 2022-07-19 ENCOUNTER — APPOINTMENT (OUTPATIENT)
Dept: LAB | Facility: CLINIC | Age: 67
End: 2022-07-19
Payer: MEDICARE

## 2022-07-22 ENCOUNTER — OFFICE VISIT (OUTPATIENT)
Dept: FAMILY MEDICINE CLINIC | Facility: CLINIC | Age: 67
End: 2022-07-22

## 2022-07-22 VITALS
BODY MASS INDEX: 31.9 KG/M2 | HEART RATE: 78 BPM | WEIGHT: 215.4 LBS | SYSTOLIC BLOOD PRESSURE: 152 MMHG | OXYGEN SATURATION: 98 % | RESPIRATION RATE: 18 BRPM | HEIGHT: 69 IN | DIASTOLIC BLOOD PRESSURE: 100 MMHG | TEMPERATURE: 97.3 F

## 2022-07-22 DIAGNOSIS — D50.9 IRON DEFICIENCY ANEMIA, UNSPECIFIED IRON DEFICIENCY ANEMIA TYPE: ICD-10-CM

## 2022-07-22 DIAGNOSIS — I10 PRIMARY HYPERTENSION: ICD-10-CM

## 2022-07-22 DIAGNOSIS — K59.03 DRUG-INDUCED CONSTIPATION: Primary | ICD-10-CM

## 2022-07-22 PROCEDURE — 3077F SYST BP >= 140 MM HG: CPT | Performed by: FAMILY MEDICINE

## 2022-07-22 PROCEDURE — 99213 OFFICE O/P EST LOW 20 MIN: CPT | Performed by: FAMILY MEDICINE

## 2022-07-22 PROCEDURE — 3080F DIAST BP >= 90 MM HG: CPT | Performed by: FAMILY MEDICINE

## 2022-07-22 RX ORDER — DOCUSATE SODIUM 100 MG/1
100 CAPSULE, LIQUID FILLED ORAL 2 TIMES DAILY
Qty: 60 CAPSULE | Refills: 1 | Status: SHIPPED | OUTPATIENT
Start: 2022-07-22 | End: 2022-10-28

## 2022-07-22 NOTE — ASSESSMENT & PLAN NOTE
- continues to be well controlled at home with averages ranging from 100-130s/60-80s  - b/l LE edema improved from prior visit, +1/trace edema L>R today  - Continue Lasix to 40 mg daily  - Discontinue losartan 50 mg BID   - encouraged patient to continue eating a healthy diet and exercising  - encouraged patient to elevate legs when sitting and use compression stockings to help with edema  - f/u 3 months or sooner if needed

## 2022-07-22 NOTE — ASSESSMENT & PLAN NOTE
Patient admitted from 05/11 to 5/15 for symptomatic anemia with hemoglobin of 3 2 an MCV of 58  Received 4 units of PRBC and Venofer x1 with appropriate improvement  No over source of bleeding was identified    - iron panel 5/11: iron 12, TIBC 467, ferritin 3   - EGD/colonoscopy 5/13:  Unremarkable  - last labs 7/19: Hgb 13 5, iron 115, ferritin 26, iron saturation 34, TIBC 340 all levels improved   - follows with heme, next appt in Aug 2022  - pt to take iron 1/2 tab daily d/t severe constipation + supplement  - fatigue and FALLON improved, suspect that this is a combination of low iron levels and deconditioning over the last several months

## 2022-07-22 NOTE — ASSESSMENT & PLAN NOTE
Secondary to iron use  - Sent in Colace, she will use daily   - can increase miralax 17 g BID for 2-3 days until pt has BM  - Advised her to cut her iron pill in half and will continue with supplement (20 mg QD) as well

## 2022-07-22 NOTE — PROGRESS NOTES
Assessment/Plan:    Drug-induced constipation  Secondary to iron use  - Sent in Colace, she will use daily   - can increase miralax 17 g BID for 2-3 days until pt has BM  - Advised her to cut her iron pill in half and will continue with supplement (20 mg QD) as well    Hypertension  - continues to be well controlled at home with averages ranging from 100-130s/60-80s  - b/l LE edema improved from prior visit, +1/trace edema L>R today  - Continue Lasix to 40 mg daily  - Discontinue losartan 50 mg BID   - encouraged patient to continue eating a healthy diet and exercising  - encouraged patient to elevate legs when sitting and use compression stockings to help with edema  - f/u 3 months or sooner if needed      Iron deficiency anemia  Patient admitted from 05/11 to 5/15 for symptomatic anemia with hemoglobin of 3 2 an MCV of 58  Received 4 units of PRBC and Venofer x1 with appropriate improvement  No over source of bleeding was identified  - iron panel 5/11: iron 12, TIBC 467, ferritin 3   - EGD/colonoscopy 5/13:  Unremarkable  - last labs 7/19: Hgb 13 5, iron 115, ferritin 26, iron saturation 34, TIBC 340 all levels improved   - follows with heme, next appt in Aug 2022  - pt to take iron 1/2 tab daily d/t severe constipation + supplement  - fatigue and FALLON improved, suspect that this is a combination of low iron levels and deconditioning over the last several months       k    Subjective:      Patient ID: Zo Ireland is a 61 y o  female  Mir Gunn is a pleasant 61year old female with a history of iron deficiency anemia and hypertension  She presents today 7/22/22 for a blood pressure check and for a follow up regarding her bilateral lower extremity edema  Last visit 6/10/22 her lasix was increased to 40 mg QD due to 1+ LE edema and blood pressure of 157/86  Additionally, she was advised to continue Losartan 50 mg at night but if BP was low, hold medication   Today, her blood pressure was 152/100, however her home SBPs have been in the 110-130 range  She reports that her brother, who she does not get along with that, arrived at her house this morning and she was very upset about his arrival  She feels that her lower leg swelling is improved, however states that she hasn't been exercising or elevating her feet consistently  Additionally, she has stopped her Losartan completely because the pharmacy told her that there were not any refills and her Bps were stable at home  She has been taking lasix 40 mg as prescribed  She states that she stopped taking her iron pill on 7/4/22 because she was experiencing severe constipation despite taking Miralax daily  Since then, she has resumed normal bowel movements  She has been taking a oral iron supplement that she obtained from a local vitamin store (box claims to contain 20 mg iron)  Her most recent labs on 07/19/22 were normal and showed iron saturation of 34, TIBC 340 and iron of 115  She has an appointment with Hem-onc in August 2022  The following portions of the patient's history were reviewed and updated as appropriate: allergies, current medications, past family history, past medical history, past social history, past surgical history and problem list     Review of Systems   Constitutional: Negative for chills and fever  HENT: Negative for ear pain and sore throat  Eyes: Negative for pain and visual disturbance  Respiratory: Negative for cough and shortness of breath  Cardiovascular: Positive for leg swelling  Negative for chest pain and palpitations  Gastrointestinal: Negative for abdominal pain and vomiting  Genitourinary: Negative for dysuria and hematuria  Musculoskeletal: Negative for arthralgias and back pain  Skin: Negative for color change and rash  Neurological: Negative for seizures and syncope  All other systems reviewed and are negative          Objective:      /100 (BP Location: Left arm, Patient Position: Sitting, Cuff Size: Standard)   Pulse 78   Temp (!) 97 3 °F (36 3 °C) (Temporal)   Resp 18   Ht 5' 9 1" (1 755 m)   Wt 97 7 kg (215 lb 6 4 oz)   SpO2 98%   BMI 31 72 kg/m²          Physical Exam  Constitutional:       Appearance: Normal appearance  Eyes:      Conjunctiva/sclera: Conjunctivae normal    Cardiovascular:      Rate and Rhythm: Normal rate and regular rhythm  Comments: B/l edema improved from prior  L>R  Pulmonary:      Effort: Pulmonary effort is normal       Breath sounds: Normal breath sounds  Musculoskeletal:         General: Normal range of motion  Right lower leg: Edema (trace) present  Left lower le+ Edema present  Skin:     General: Skin is warm  Findings: Bruising (bilateral shins) and rash (petechial rash dorsum of R foot - non-tender, no itching, no blanching) present  Neurological:      General: No focal deficit present  Mental Status: She is alert     Psychiatric:         Mood and Affect: Mood normal          Behavior: Behavior normal

## 2022-08-03 ENCOUNTER — APPOINTMENT (OUTPATIENT)
Dept: LAB | Facility: CLINIC | Age: 67
End: 2022-08-03
Payer: MEDICARE

## 2022-08-18 ENCOUNTER — APPOINTMENT (OUTPATIENT)
Dept: LAB | Facility: CLINIC | Age: 67
End: 2022-08-18
Payer: MEDICARE

## 2022-08-25 DIAGNOSIS — R60.0 LOWER EXTREMITY EDEMA: ICD-10-CM

## 2022-08-25 RX ORDER — FUROSEMIDE 20 MG/1
40 TABLET ORAL DAILY
Qty: 30 TABLET | Refills: 2 | Status: SHIPPED | OUTPATIENT
Start: 2022-08-25

## 2022-08-25 NOTE — TELEPHONE ENCOUNTER
Received voicemail from patient requesting medication refill of Furosemide 20mg tablets  Patient would like this sent to [facility] on [address] in [facility]

## 2022-08-30 ENCOUNTER — APPOINTMENT (OUTPATIENT)
Dept: LAB | Facility: CLINIC | Age: 67
End: 2022-08-30
Payer: MEDICARE

## 2022-09-15 ENCOUNTER — APPOINTMENT (OUTPATIENT)
Dept: LAB | Facility: CLINIC | Age: 67
End: 2022-09-15
Payer: MEDICARE

## 2022-09-28 ENCOUNTER — APPOINTMENT (OUTPATIENT)
Dept: LAB | Facility: CLINIC | Age: 67
End: 2022-09-28
Payer: MEDICARE

## 2022-10-11 ENCOUNTER — APPOINTMENT (OUTPATIENT)
Dept: LAB | Facility: CLINIC | Age: 67
End: 2022-10-11
Payer: MEDICARE

## 2022-10-21 ENCOUNTER — OFFICE VISIT (OUTPATIENT)
Dept: FAMILY MEDICINE CLINIC | Facility: CLINIC | Age: 67
End: 2022-10-21

## 2022-10-21 VITALS
HEART RATE: 70 BPM | SYSTOLIC BLOOD PRESSURE: 170 MMHG | TEMPERATURE: 98 F | DIASTOLIC BLOOD PRESSURE: 87 MMHG | WEIGHT: 210 LBS | OXYGEN SATURATION: 100 % | RESPIRATION RATE: 18 BRPM | BODY MASS INDEX: 30.92 KG/M2

## 2022-10-21 DIAGNOSIS — D50.9 IRON DEFICIENCY ANEMIA, UNSPECIFIED IRON DEFICIENCY ANEMIA TYPE: ICD-10-CM

## 2022-10-21 DIAGNOSIS — I10 PRIMARY HYPERTENSION: ICD-10-CM

## 2022-10-21 DIAGNOSIS — L30.9 ECZEMA, UNSPECIFIED TYPE: Primary | ICD-10-CM

## 2022-10-21 PROCEDURE — 99213 OFFICE O/P EST LOW 20 MIN: CPT | Performed by: FAMILY MEDICINE

## 2022-10-21 PROCEDURE — 3077F SYST BP >= 140 MM HG: CPT | Performed by: FAMILY MEDICINE

## 2022-10-21 PROCEDURE — 3079F DIAST BP 80-89 MM HG: CPT | Performed by: FAMILY MEDICINE

## 2022-10-21 NOTE — ASSESSMENT & PLAN NOTE
- continues to be well controlled at home with averages ranging from 130s/80s  - BP elevated in office as usual - reports some family stress and has known white coat syndrome  - b/l LE edema improved from prior visit but persists  - continue Lasix to 40 mg daily  - previous meds: losartan 50 mg BID   - encouraged patient to continue eating a healthy diet and exercising  - encouraged patient to elevate legs when sitting and use compression stockings to help with edema  - f/u 3 months or sooner if needed

## 2022-10-21 NOTE — PROGRESS NOTES
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  - rx triamcinolone 0 1% use BID with use of emolliants and vaseline in between to make sure areas have adequate hydration  - encourage patient to lotion rest of legs daily as well  - informed patient that compression stocking may make it worse so wrap area before using   - f/u in 1 week for recheck    Orders:  -     triamcinolone (KENALOG) 0 1 % ointment; Apply topically 2 (two) times a day    2  Primary hypertension  Assessment & Plan:  - continues to be well controlled at home with averages ranging from 130s/80s  - BP elevated in office as usual - reports some family stress and has known white coat syndrome  - b/l LE edema improved from prior visit but persists  - continue Lasix to 40 mg daily  - previous meds: losartan 50 mg BID   - encouraged patient to continue eating a healthy diet and exercising  - encouraged patient to elevate legs when sitting and use compression stockings to help with edema  - f/u 3 months or sooner if needed        3  Iron deficiency anemia, unspecified iron deficiency anemia type  Assessment & Plan:  Patient admitted from  to 5/15 for symptomatic anemia with hemoglobin of 3 2 an MCV of 58  Received 4 units of PRBC and Venofer x1 with appropriate improvement  No over source of bleeding was identified    - iron panel : iron 12, TIBC 467, ferritin 3   - EGD/colonoscopy :  Unremarkable  - last labs : Hgb 13 5, iron 115, ferritin 26, iron saturation 34, TIBC 340 all levels improved   - follows with heme, next appt in Aug 2022  - pt to take iron 1/2 tab daily d/t severe constipation + supplement  - fatigue and FALLON improving  - sent message to our staff to help facilitate appt with laura           Subjective      75 y/o F presents for follow up evaluation  Continues to note fatigue and leg pain/swelling but improved from previous  She is now able joey lumb up stairs which wasn't able to do before because of the leg pain  Describes herself "much improved" from previous  Bps averaging in 130s/80s at home  Pts BP always elevated in office due to white coat syndrome  Denies CP, Sob, palpitations  Continues on lasix 40 mg daily  Reports growing rash on R lateral food and L medial foot - worsening since last visit  Was supposed to have appointment with Heme-Onc, Dr Nova Robles, but says they had canceled it and she has been unable to reach them to set up  Requests assistance with this  Review of Systems   Constitutional: Positive for fatigue  Negative for chills and fever  HENT: Negative for congestion and rhinorrhea  Eyes: Negative for visual disturbance  Respiratory: Negative for cough and shortness of breath  Cardiovascular: Negative for chest pain and palpitations  Gastrointestinal: Negative for abdominal pain, constipation, diarrhea, nausea and vomiting  Genitourinary: Negative for hematuria  Musculoskeletal: Positive for arthralgias (knees)  Skin: Positive for rash (R foot and L ankle)  Neurological: Negative for dizziness, light-headedness and headaches         Current Outpatient Medications on File Prior to Visit   Medication Sig   • ferrous sulfate 324 (65 Fe) mg Take 1 tablet (324 mg total) by mouth daily before breakfast   • furosemide (LASIX) 20 mg tablet Take 2 tablets (40 mg total) by mouth daily   • docusate sodium (COLACE) 100 mg capsule Take 1 capsule (100 mg total) by mouth 2 (two) times a day   • [DISCONTINUED] losartan (COZAAR) 50 mg tablet Take 2 tablets (100 mg total) by mouth daily (Patient taking differently: Take 50 mg by mouth 2 (two) times a day)   • [DISCONTINUED] polyethylene glycol (MIRALAX) 17 g packet Take 17 g by mouth daily (Patient not taking: Reported on 10/21/2022)       Objective     /87   Pulse 70   Temp 98 °F (36 7 °C)   Resp 18   Wt 95 3 kg (210 lb)   SpO2 100%   BMI 30 92 kg/m²     Physical Exam  Vitals reviewed  Constitutional:       General: She is not in acute distress  Appearance: Normal appearance  HENT:      Head: Normocephalic and atraumatic  Right Ear: External ear normal       Left Ear: External ear normal       Nose: Nose normal       Mouth/Throat:      Pharynx: Oropharynx is clear  Eyes:      Conjunctiva/sclera: Conjunctivae normal    Cardiovascular:      Rate and Rhythm: Normal rate and regular rhythm  Pulses: Normal pulses  Heart sounds: Normal heart sounds  Pulmonary:      Effort: Pulmonary effort is normal       Breath sounds: Normal breath sounds  Abdominal:      Palpations: Abdomen is soft  Musculoskeletal:      Cervical back: Neck supple  Right lower leg: No edema  Left lower leg: No edema  Skin:     General: Skin is warm  Capillary Refill: Capillary refill takes less than 2 seconds  Comments: R lateral foot - large area of dry patchy skin with brown discoloration with clear demarcation   Neurological:      Mental Status: She is alert and oriented to person, place, and time  Gait: Gait normal    Psychiatric:         Mood and Affect: Mood normal          Behavior: Behavior normal          Thought Content:  Thought content normal          Judgment: Judgment normal                  Gurinder King DO

## 2022-10-21 NOTE — ASSESSMENT & PLAN NOTE
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

## 2022-10-21 NOTE — ASSESSMENT & PLAN NOTE
Patient admitted from 05/11 to 5/15 for symptomatic anemia with hemoglobin of 3 2 an MCV of 58  Received 4 units of PRBC and Venofer x1 with appropriate improvement  No over source of bleeding was identified    - iron panel 5/11: iron 12, TIBC 467, ferritin 3   - EGD/colonoscopy 5/13:  Unremarkable  - last labs 7/19: Hgb 13 5, iron 115, ferritin 26, iron saturation 34, TIBC 340 all levels improved   - follows with heme, next appt in Aug 2022  - pt to take iron 1/2 tab daily d/t severe constipation + supplement  - fatigue and FALLON improving  - sent message to our staff to help facilitate appt with heme

## 2022-10-25 ENCOUNTER — APPOINTMENT (OUTPATIENT)
Dept: LAB | Facility: CLINIC | Age: 67
End: 2022-10-25
Payer: MEDICARE

## 2022-10-28 ENCOUNTER — OFFICE VISIT (OUTPATIENT)
Dept: FAMILY MEDICINE CLINIC | Facility: CLINIC | Age: 67
End: 2022-10-28

## 2022-10-28 VITALS
DIASTOLIC BLOOD PRESSURE: 92 MMHG | HEIGHT: 69 IN | HEART RATE: 70 BPM | SYSTOLIC BLOOD PRESSURE: 184 MMHG | OXYGEN SATURATION: 98 % | WEIGHT: 209 LBS | BODY MASS INDEX: 30.96 KG/M2 | RESPIRATION RATE: 20 BRPM | TEMPERATURE: 98.7 F

## 2022-10-28 DIAGNOSIS — L30.9 ECZEMA, UNSPECIFIED TYPE: Primary | ICD-10-CM

## 2022-10-28 DIAGNOSIS — I10 PRIMARY HYPERTENSION: ICD-10-CM

## 2022-10-28 DIAGNOSIS — H61.23 BILATERAL IMPACTED CERUMEN: ICD-10-CM

## 2022-10-28 NOTE — ASSESSMENT & PLAN NOTE
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

## 2022-10-28 NOTE — ASSESSMENT & PLAN NOTE
- increased life stressors at home with family death, friend in hospital and her daughter (10 y/o with behavioral issues) living at her house, inability to sleep   - expect her life stress to improve in a month but not any sooner  - home average consistently in 140/90s  - BP elevated in office as usual with recheck 180s/100s - reports some family stress and has known white coat syndrome  - continue Lasix to 40 mg daily  - previous meds: losartan 50 mg BID   - will restart losartan at 25 mg due to persistently increasing BP in office and at home; can increase to 50 mg daily after a week if BP still elevated   - encouraged patient to continue eating a healthy diet and exercising  - encouraged patient to elevate legs when sitting and use compression stockings to help with edema  - f/u 6 weeks or sooner if needed

## 2022-10-28 NOTE — PROGRESS NOTES
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, significant improvement with triamcinolone 0 1% that was started last week  - continue to use triamcinolone 0 1% use BID with use of emolliants and vaseline in between to make sure areas have adequate hydration x 2 more weeks  - encourage patient to continue lotion rest of legs/arms daily as well  - informed patient that compression stocking may make it worse so wrap area before using   - f/u in 4 weeks       2  Primary hypertension  Assessment & Plan:  - increased life stressors at home with family death, friend in hospital and her daughter (10 y/o with behavioral issues) living at her house, inability to sleep   - expect her life stress to improve in a month but not any sooner  - home average consistently in 140/90s  - BP elevated in office as usual with recheck 180s/100s - reports some family stress and has known white coat syndrome  - continue Lasix to 40 mg daily  - previous meds: losartan 50 mg BID   - will restart losartan at 25 mg due to persistently increasing BP in office and at home; can increase to 50 mg daily after a week if BP still elevated   - encouraged patient to continue eating a healthy diet and exercising  - encouraged patient to elevate legs when sitting and use compression stockings to help with edema  - f/u 6 weeks or sooner if needed        3  Bilateral impacted cerumen  -     carbamide peroxide (DEBROX) 6 5 % otic solution; Administer 5 drops into both ears 2 (two) times a day         Subjective      73 y/o F presents for follow up of her eczema  Has been using kenalog BID as prescribed and adequate hydration with significant improvement   Denies any pruritus and she reports that this area is no longer expanding with the use of the cream      Her blood pressures continued to be elevated in office in setting of known white coat syndrome  However, Bps now elevated at home into 140s/90s most of the time  Significant life stressors recently - aunt passed away, family drama, friend in hospital so she and her elderly mother is taking care of friend's 10year old daughter with behavioral issues  Specifically, she reports that the child does not listen well and is up all hours of the night which means that she also has not been able to sleep well recently  These situations are causing her a lot of stress  In terms of her cerumen imapction, has been using off-brand debrox drops to help and she has noticed 80% improvement in her dizziness/vertigo since starting these drops  Review of Systems   Constitutional: Negative for chills and fever  HENT: Negative for congestion and rhinorrhea  Eyes: Negative for visual disturbance  Respiratory: Negative for cough and shortness of breath  Cardiovascular: Negative for chest pain and palpitations  Gastrointestinal: Negative for abdominal pain, constipation, diarrhea, nausea and vomiting  Genitourinary: Negative for dysuria  Musculoskeletal: Negative for arthralgias  Skin: Positive for rash  Neurological: Negative for dizziness, light-headedness and headaches         Current Outpatient Medications on File Prior to Visit   Medication Sig   • docusate sodium (COLACE) 100 mg capsule Take 1 capsule (100 mg total) by mouth 2 (two) times a day   • ferrous sulfate 324 (65 Fe) mg Take 1 tablet (324 mg total) by mouth daily before breakfast   • furosemide (LASIX) 20 mg tablet Take 2 tablets (40 mg total) by mouth daily   • triamcinolone (KENALOG) 0 1 % ointment Apply topically 2 (two) times a day       Objective     BP (!) 184/92 (BP Location: Left arm, Patient Position: Sitting, Cuff Size: Large)   Pulse 70   Temp 98 7 °F (37 1 °C) (Temporal) Resp 20   Ht 5' 9" (1 753 m)   Wt 94 8 kg (209 lb)   SpO2 98%   BMI 30 86 kg/m²     Physical Exam  Vitals reviewed  Constitutional:       General: She is not in acute distress  Appearance: Normal appearance  HENT:      Head: Normocephalic and atraumatic  Right Ear: Tympanic membrane, ear canal and external ear normal       Left Ear: External ear normal  There is impacted cerumen (improved but still hard cerumen, continue to use debrox drops)  Nose: Nose normal       Mouth/Throat:      Pharynx: Oropharynx is clear  Eyes:      Conjunctiva/sclera: Conjunctivae normal    Cardiovascular:      Rate and Rhythm: Normal rate and regular rhythm  Pulses: Normal pulses  Heart sounds: Normal heart sounds  Pulmonary:      Effort: Pulmonary effort is normal       Breath sounds: Normal breath sounds  Abdominal:      Palpations: Abdomen is soft  Musculoskeletal:      Cervical back: Neck supple  Right lower leg: No edema  Left lower leg: No edema  Skin:     General: Skin is warm  Capillary Refill: Capillary refill takes less than 2 seconds  Comments: Eczema with significant hyperpigmentation on both feet, improved from previous with kenalog (borders no longer raised, hyperpigmentation improved, not pruritic)   Neurological:      Mental Status: She is alert and oriented to person, place, and time  Gait: Gait normal    Psychiatric:         Mood and Affect: Mood normal          Behavior: Behavior normal          Thought Content:  Thought content normal          Judgment: Judgment normal                            Gurinder King,

## 2022-11-09 ENCOUNTER — APPOINTMENT (OUTPATIENT)
Dept: LAB | Facility: CLINIC | Age: 67
End: 2022-11-09

## 2022-11-22 ENCOUNTER — APPOINTMENT (OUTPATIENT)
Dept: LAB | Facility: CLINIC | Age: 67
End: 2022-11-22

## 2022-11-29 ENCOUNTER — HOSPITAL ENCOUNTER (OUTPATIENT)
Dept: RADIOLOGY | Age: 67
Discharge: HOME | End: 2022-11-29

## 2022-11-29 VITALS — BODY MASS INDEX: 30.96 KG/M2 | HEIGHT: 69 IN | WEIGHT: 209 LBS

## 2022-11-29 DIAGNOSIS — Z12.31 SCREENING MAMMOGRAM, ENCOUNTER FOR: ICD-10-CM

## 2022-11-30 ENCOUNTER — RA CDI HCC (OUTPATIENT)
Dept: OTHER | Facility: HOSPITAL | Age: 67
End: 2022-11-30

## 2022-11-30 NOTE — PROGRESS NOTES
[address]  coding opportunities       Chart reviewed, no opportunity found: CHART REVIEWED, NO OPPORTUNITY FOUND        Patients Insurance     Medicare Insurance: Medicare

## 2022-12-05 ENCOUNTER — OFFICE VISIT (OUTPATIENT)
Dept: FAMILY MEDICINE CLINIC | Facility: CLINIC | Age: 67
End: 2022-12-05

## 2022-12-05 VITALS
SYSTOLIC BLOOD PRESSURE: 161 MMHG | TEMPERATURE: 97.9 F | WEIGHT: 209 LBS | BODY MASS INDEX: 30.96 KG/M2 | HEIGHT: 69 IN | HEART RATE: 73 BPM | DIASTOLIC BLOOD PRESSURE: 103 MMHG

## 2022-12-05 DIAGNOSIS — L30.9 ECZEMA, UNSPECIFIED TYPE: ICD-10-CM

## 2022-12-05 DIAGNOSIS — I10 PRIMARY HYPERTENSION: ICD-10-CM

## 2022-12-05 DIAGNOSIS — K59.03 DRUG-INDUCED CONSTIPATION: Primary | ICD-10-CM

## 2022-12-05 RX ORDER — LOSARTAN POTASSIUM 25 MG/1
25 TABLET ORAL DAILY
Qty: 90 TABLET | Refills: 1 | Status: SHIPPED | OUTPATIENT
Start: 2022-12-05 | End: 2023-03-05

## 2022-12-05 NOTE — ASSESSMENT & PLAN NOTE
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

## 2022-12-05 NOTE — PROGRESS NOTES
Name: Luisa Razo      : 1955      MRN: [id number]  Encounter Provider: Ellyn Isabel DO  Encounter Date: 2022   Encounter department: [address] [place] [address]     1  Drug-induced constipation  Assessment & Plan:  - secondary to iron use  - colonoscopy 2022 - f/u 7 years due to hx of colon polyps  - has been using colace and miralax with minimal improvement  - encourage increase in hydration (at least 1 L daily) and increase in fiber (with organic foods or OTC psyllium) and movement  - f/u 3 months or sooner if no improvemet      2  Primary hypertension  Assessment & Plan:  - increased life stressors haven't changed too much - another recent death in the family (her uncle)  - continues to have home average consistently in 140/90s with some numbers below this range  - BP elevated in office as usual at 161/103 - reports some family stress and has known white coat syndrome  - continue Lasix to 40 mg daily  - previous meds: losartan 50 mg BID   - patient did not re-start losartan as discussed at last visit - will send losartan 25 mg daily and discussed importance of starting medication for better BP control  - encouraged patient to continue eating a healthy diet and exercising  - encouraged patient to elevate legs when sitting and use compression stockings to help with edema  - f/u 3 months or sooner if needed      Orders:  -     losartan (COZAAR) 25 mg tablet; Take 1 tablet (25 mg total) by mouth daily    3  Eczema, unspecified type  Assessment & Plan:  - 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  - stopped triamcinolone 0 1% use after 3 total weeks; sxs improved but discoloration remains - explained that it may not fully go away  - continue emolliants and vaseline as a skin barrier especially in winter as it is drying  - f/u in 3 months or sooner if needed           Subjective      73 y/o F presents for follow up evaluation   Her eczema is about the same/plateaued and she stopped using the steroid cream  Itchiness improved  Her blood pressure continues to be elevated at home, mostly in the 140s/90s but notes that it was 118/90 prior to arrival in office  Continues on lasix 40 mg daily  Did not start losartan at last visit because not available at pharmacy  She does continue to have increased life stress - her uncle gene recently passed away  Patient complains of functional constipation, most likely drug-induced in setting of daily iron use  Has been using Colace daily as well as MiraLax every 2-3 days with intermittent episodes of explosive diarrhea  Stool is more formed usually  Reports that she does not really have adequate hydration and she often skips meals  Overall she notes that she feels stronger and better this office visit but her constipation remains a big issue  Review of Systems   Constitutional: Negative for chills and fever  HENT: Negative for congestion and rhinorrhea  Eyes: Negative for visual disturbance  Respiratory: Negative for cough and shortness of breath  Cardiovascular: Negative for chest pain and palpitations  Gastrointestinal: Positive for constipation  Negative for abdominal pain, diarrhea, nausea and vomiting  Genitourinary: Negative for dysuria  Musculoskeletal: Negative for arthralgias  Skin: Positive for rash  Neurological: Negative for dizziness, light-headedness and headaches         Current Outpatient Medications on File Prior to Visit   Medication Sig   • ferrous sulfate 324 (65 Fe) mg Take 1 tablet (324 mg total) by mouth daily before breakfast   • furosemide (LASIX) 20 mg tablet Take 2 tablets (40 mg total) by mouth daily   • triamcinolone (KENALOG) 0 1 % ointment Apply topically 2 (two) times a day   • carbamide peroxide (DEBROX) 6 5 % otic solution Administer 5 drops into both ears 2 (two) times a day (Patient not taking: Reported on 12/5/2022)   • docusate sodium (COLACE) 100 mg capsule Take 1 capsule (100 mg total) by mouth 2 (two) times a day (Patient not taking: Reported on 12/5/2022)       Objective     BP (!) 161/103 (BP Location: Right arm, Patient Position: Sitting, Cuff Size: Large)   Pulse 73   Temp 97 9 °F (36 6 °C) (Temporal)   Ht 5' 9" (1 753 m)   Wt 94 8 kg (209 lb)   BMI 30 86 kg/m²     Physical Exam  Vitals reviewed  Constitutional:       General: She is not in acute distress  Appearance: Normal appearance  HENT:      Head: Normocephalic and atraumatic  Right Ear: External ear normal       Left Ear: External ear normal       Nose: Nose normal       Mouth/Throat:      Pharynx: Oropharynx is clear  Eyes:      Conjunctiva/sclera: Conjunctivae normal    Cardiovascular:      Rate and Rhythm: Normal rate  Pulses: Normal pulses  Pulmonary:      Effort: Pulmonary effort is normal       Breath sounds: Normal breath sounds  Abdominal:      Palpations: Abdomen is soft  Musculoskeletal:      Cervical back: Neck supple  Right lower leg: No edema  Left lower leg: No edema  Skin:     General: Skin is warm  Capillary Refill: Capillary refill takes less than 2 seconds  Findings: Rash (mildly improved from previous; stable) present  Neurological:      Mental Status: She is alert and oriented to person, place, and time  Gait: Gait normal    Psychiatric:         Mood and Affect: Mood normal          Behavior: Behavior normal          Thought Content:  Thought content normal          Judgment: Judgment normal        Gurinder King,

## 2022-12-05 NOTE — ASSESSMENT & PLAN NOTE
- increased life stressors haven't changed too much - another recent death in the family (her uncle)  - continues to have home average consistently in 140/90s with some numbers below this range  - BP elevated in office as usual at 161/103 - reports some family stress and has known white coat syndrome  - continue Lasix to 40 mg daily  - previous meds: losartan 50 mg BID   - patient did not re-start losartan as discussed at last visit - will send losartan 25 mg daily and discussed importance of starting medication for better BP control  - encouraged patient to continue eating a healthy diet and exercising  - encouraged patient to elevate legs when sitting and use compression stockings to help with edema  - f/u 3 months or sooner if needed

## 2022-12-05 NOTE — ASSESSMENT & PLAN NOTE
- secondary to iron use    - colonoscopy 5/2022 - f/u 7 years due to hx of colon polyps  - has been using colace and miralax with minimal improvement  - encourage increase in hydration (at least 1 L daily) and increase in fiber (with organic foods or OTC psyllium) and movement  - f/u 3 months or sooner if no improvemet

## 2022-12-08 ENCOUNTER — APPOINTMENT (OUTPATIENT)
Dept: LAB | Facility: CLINIC | Age: 67
End: 2022-12-08

## 2022-12-20 ENCOUNTER — APPOINTMENT (OUTPATIENT)
Dept: LAB | Facility: CLINIC | Age: 67
End: 2022-12-20

## 2023-01-05 ENCOUNTER — APPOINTMENT (OUTPATIENT)
Dept: LAB | Facility: CLINIC | Age: 68
End: 2023-01-05

## 2023-01-06 ENCOUNTER — TELEPHONE (OUTPATIENT)
Dept: HEMATOLOGY ONCOLOGY | Facility: CLINIC | Age: 68
End: 2023-01-06

## 2023-01-06 NOTE — TELEPHONE ENCOUNTER
Left  for patient to let her know that I scheduled her for a follow-up appt with Dr Tato Kapoor in her next available spot in [place] on 2/17  I left my teams number for her to callback

## 2023-01-10 ENCOUNTER — TELEPHONE (OUTPATIENT)
Dept: HEMATOLOGY ONCOLOGY | Facility: CLINIC | Age: 68
End: 2023-01-10

## 2023-01-18 ENCOUNTER — APPOINTMENT (OUTPATIENT)
Dept: LAB | Facility: CLINIC | Age: 68
End: 2023-01-18

## 2023-02-01 ENCOUNTER — APPOINTMENT (OUTPATIENT)
Dept: LAB | Facility: CLINIC | Age: 68
End: 2023-02-01

## 2023-02-14 ENCOUNTER — APPOINTMENT (OUTPATIENT)
Dept: LAB | Facility: CLINIC | Age: 68
End: 2023-02-14

## 2023-02-17 ENCOUNTER — OFFICE VISIT (OUTPATIENT)
Dept: HEMATOLOGY ONCOLOGY | Facility: CLINIC | Age: 68
End: 2023-02-17

## 2023-02-17 VITALS
BODY MASS INDEX: 29.92 KG/M2 | WEIGHT: 202 LBS | SYSTOLIC BLOOD PRESSURE: 160 MMHG | HEIGHT: 69 IN | TEMPERATURE: 98.3 F | OXYGEN SATURATION: 99 % | DIASTOLIC BLOOD PRESSURE: 94 MMHG | HEART RATE: 88 BPM

## 2023-02-17 DIAGNOSIS — D50.9 IRON DEFICIENCY ANEMIA, UNSPECIFIED IRON DEFICIENCY ANEMIA TYPE: Primary | ICD-10-CM

## 2023-02-17 NOTE — PROGRESS NOTES
HEMATOLOGY / [address] FOLLOW UP NOTE    Primary Care Provider: Mordechai Nyhan, DO  Referring Provider:    MRN: [id number]  : 1955    Reason for Encounter: iron deficiency anemia       Oncology History    No history exists  Interval History:  Patient presents for a 6 month follow up for iron deficiency anemia  She has been taking oral iron, one week takes it 4 days and some weeks 5 days  She is experiencing constipation where there are days she does not have a BM for a few days  Today she states she has not had a BM for 5 days  Her PCP has put her on a bowel regimen, which offers mild relief  Briseida Richards is experiencing extreme fatigue, tasks such as grocery shopping increase her fatigue  She gets headaches intermittently  Has some SOB, denies CP, RLS, PICA  Most recent labs Hgb 15 1, MCV 90, iron 78, iron saturation 21%, Ferritin 20  REVIEW OF SYSTEMS:  Please note that a 14-point review of systems was performed to include Constitutional, HEENT, Respiratory, CVS, GI, , Musculoskeletal, Integumentary, Neurologic, Rheumatologic, Endocrinologic, Psychiatric, Lymphatic, and Hematologic/Oncologic systems were reviewed and are negative unless otherwise stated in HPI  Positive and negative findings pertinent to this evaluation are incorporated into the history of present illness  ECOG PS: 0    PROBLEM LIST:  Patient Active Problem List   Diagnosis   • FALLON (dyspnea on exertion)   • Iron deficiency anemia   • High output heart failure (HCC)   • Hypertension   • Colon polyps   • Lower extremity edema   • Drug-induced constipation   • Decreased appetite   • Eczema       Assessment / Plan: Due to patient's intolerance of oral iron, recommend she get IV iron  Patient hesitant as she doesn't think it will be covered by insurance  Assured her it will be  Reviewed side effects of nausea, headache, diarrhea or anaphylaxis  Patient would like to think about it    Advised she take oral iron every other day  Patient can follow up with PCP if she decides to continue oral iron  If she decides to proceed with IV iron, she will call us to schedule and we will see her back in the office in a year  She will not require additional blood work unless she decides to proceed with IV iron and we will require it a week prior to her yearly appointment  I spent 30 minutes on chart review, face to face counseling time, coordination of care and documentation  Past Medical History:   has no past medical history on file  PAST SURGICAL HISTORY:   has a past surgical history that includes Tonsillectomy  CURRENT MEDICATIONS  Current Outpatient Medications   Medication Sig Dispense Refill   • carbamide peroxide (DEBROX) 6 5 % otic solution Administer 5 drops into both ears 2 (two) times a day 15 mL 1   • docusate sodium (COLACE) 100 mg capsule Take 1 capsule (100 mg total) by mouth 2 (two) times a day (Patient not taking: Reported on 12/5/2022) 60 capsule 1   • ferrous sulfate 324 (65 Fe) mg Take 1 tablet (324 mg total) by mouth daily before breakfast 90 tablet 3   • furosemide (LASIX) 20 mg tablet Take 2 tablets (40 mg total) by mouth daily 30 tablet 2   • losartan (COZAAR) 25 mg tablet Take 1 tablet (25 mg total) by mouth daily 90 tablet 1   • triamcinolone (KENALOG) 0 1 % ointment Apply topically 2 (two) times a day 30 g 0     No current facility-administered medications for this visit  [unfilled]    SOCIAL HISTORY:   reports that she has never smoked  She has never used smokeless tobacco  She reports that she does not drink alcohol and does not use drugs  FAMILY HISTORY:  family history includes No Known Problems in her father and mother; Stomach cancer in her paternal grandmother  ALLERGIES:  is allergic to penicillins, shellfish-derived products - food allergy, and latex        Physical Exam:  Vital Signs:   Visit Vitals  /94 (BP Location: Left arm, Patient Position: Sitting, Cuff Size: Standard)   Pulse 88   Temp 98 3 °F (36 8 °C) (Temporal)   Ht 5' 9" (1 753 m)   Wt 91 6 kg (202 lb)   SpO2 99%   BMI 29 83 kg/m²   OB Status Postmenopausal   Smoking Status Never   BSA 2 07 m²     Body mass index is 29 83 kg/m²  Body surface area is 2 07 meters squared  GEN: Alert, awake oriented x3, in no acute distress  HEENT- No pallor, icterus, cyanosis, no oral mucosal lesions,   LAD - no palpable cervical, clavicle, axillary, inguinal LAD  Heart- normal S1 S2, regular rate and rhythm, No murmur, rubs     Lungs- clear breathing sound bilateral    Abdomen- soft, Non tender, bowel sounds present  Extremities- No cyanosis, clubbing, edema  Neuro- No focal neurological deficit    Labs:  Lab Results   Component Value Date    WBC 6 42 02/14/2023    HGB 15 1 02/14/2023    HCT 46 3 (H) 02/14/2023    MCV 90 02/14/2023     02/14/2023     Lab Results   Component Value Date    SODIUM 141 05/14/2022    K 3 2 (L) 05/14/2022     05/14/2022    CO2 31 05/14/2022    AGAP 5 05/14/2022    BUN 11 05/14/2022    CREATININE 0 79 05/14/2022    GLUC 90 05/14/2022    CALCIUM 8 9 05/14/2022    AST 41 05/11/2022    ALT 55 05/11/2022    ALKPHOS 124 (H) 05/11/2022    TP 6 2 (L) 05/11/2022    TBILI 0 81 05/11/2022    EGFR 79 05/14/2022

## 2023-02-21 ENCOUNTER — TELEPHONE (OUTPATIENT)
Dept: HEMATOLOGY ONCOLOGY | Facility: CLINIC | Age: 68
End: 2023-02-21

## 2023-02-21 DIAGNOSIS — D50.9 IRON DEFICIENCY ANEMIA, UNSPECIFIED IRON DEFICIENCY ANEMIA TYPE: Primary | ICD-10-CM

## 2023-02-21 RX ORDER — SODIUM CHLORIDE 9 MG/ML
20 INJECTION, SOLUTION INTRAVENOUS ONCE
Status: CANCELLED | OUTPATIENT
Start: 2023-02-28

## 2023-02-21 NOTE — TELEPHONE ENCOUNTER
Rec'd call from patient asking to have IV iron set up  I reviewed how IV iron is given and approx times of the infusion  Patient verbalized understanding and is in agreement with the plan

## 2023-02-21 NOTE — TELEPHONE ENCOUNTER
While we try to accommodate patient requests, our priority is to schedule treatment according to Doctor's orders and site availability  Does the Provider use the intake sheet or checkout note? What would be a preferred day of the week that would work best for your infusion appointment? Any day but Wednesday  Do you prefer mornings or afternoons for your appointments? any  Are there any days or dates that do not work for your schedule, including any upcoming vacations? 3/6  We are going to try our best to schedule you at the infusion center closest to your home  In the event that we are unable to what would be your next preferred infusion site or sites? 1  [facility]      Do you have transportation to take you to all of your appointments?  Yes  Would you like the infusion center to draw labs from your port? (disregard if patient doesn't have a port or need labs for infusion appointment) n/a

## 2023-02-22 NOTE — TELEPHONE ENCOUNTER
Left message on answering machine with time and date of iron infusions, patient is aware schedule can be seen on mychart  Patient was provided with my teams number to return my call and confirm appts

## 2023-02-28 ENCOUNTER — HOSPITAL ENCOUNTER (OUTPATIENT)
Dept: INFUSION CENTER | Facility: CLINIC | Age: 68
Discharge: HOME | End: 2023-02-28

## 2023-02-28 VITALS
SYSTOLIC BLOOD PRESSURE: 160 MMHG | HEART RATE: 69 BPM | TEMPERATURE: 98.3 F | OXYGEN SATURATION: 96 % | DIASTOLIC BLOOD PRESSURE: 90 MMHG | RESPIRATION RATE: 18 BRPM

## 2023-02-28 DIAGNOSIS — D50.9 IRON DEFICIENCY ANEMIA, UNSPECIFIED IRON DEFICIENCY ANEMIA TYPE: Primary | ICD-10-CM

## 2023-02-28 RX ORDER — SODIUM CHLORIDE 9 MG/ML
20 INJECTION, SOLUTION INTRAVENOUS ONCE
Status: CANCELLED | OUTPATIENT
Start: 2023-03-07

## 2023-02-28 RX ORDER — SODIUM CHLORIDE 9 MG/ML
20 INJECTION, SOLUTION INTRAVENOUS ONCE
Status: COMPLETED | OUTPATIENT
Start: 2023-02-28 | End: 2023-02-28

## 2023-02-28 RX ADMIN — SODIUM CHLORIDE 20 ML/HR: 0.9 INJECTION, SOLUTION INTRAVENOUS at 15:00

## 2023-02-28 RX ADMIN — FERUMOXYTOL 510 MG: 510 INJECTION INTRAVENOUS at 15:04

## 2023-02-28 NOTE — PATIENT INSTRUCTIONS
February 2023 Sunday Monday Tuesday Wednesday Thursday Friday Saturday                  1    LAB WALK IN   9:45 AM   (5 min )   [facility] CHAIR 1   [facility] [address] [facility] 2     3     4                5     6     7     8     9     10     11                12     13     14    LAB WALK IN   9:15 AM   (5 min )   [facility] FIGUEROA CHAIR 1   [facility] 15     16     17    FOLLOW UP PG  10:45 AM   (20 min )   Erasmo Laureano, [address] [facility] [place] 18                19     20     21     22     23     24     25                26     27     28    INF THERAPY PLAN   3:00 PM   (90 min )   [facility]                         Cycle 1, Treatment 1              Treatment Details         2/28/2023 - Cycle 1, Treatment 1      Supportive Care: [address] [employer], [facility])        March 2023 Sunday Monday Tuesday Wednesday Thursday Friday Saturday                  1     2     3     4                5     6     7    INF THERAPY PLAN   3:00 PM   (90 min )   [facility] INF CHAIR Thomas Miller [address] [facility] 8     9     10     11                12     13     14     15     16     17     92                31     74     29     21     83     30     22                48     22     94     35     70     48

## 2023-02-28 NOTE — PROGRESS NOTES
Patient here for first dose feraheme and tolerated this without incident  Patient discharged post, no c/o offered  AVS given, next appt 3/7/23

## 2023-03-07 ENCOUNTER — HOSPITAL ENCOUNTER (OUTPATIENT)
Dept: INFUSION CENTER | Facility: CLINIC | Age: 68
Discharge: HOME | End: 2023-03-07

## 2023-03-07 VITALS
OXYGEN SATURATION: 98 % | DIASTOLIC BLOOD PRESSURE: 88 MMHG | RESPIRATION RATE: 18 BRPM | TEMPERATURE: 98.8 F | SYSTOLIC BLOOD PRESSURE: 138 MMHG | HEART RATE: 71 BPM

## 2023-03-07 DIAGNOSIS — D50.9 IRON DEFICIENCY ANEMIA, UNSPECIFIED IRON DEFICIENCY ANEMIA TYPE: Primary | ICD-10-CM

## 2023-03-07 RX ORDER — SODIUM CHLORIDE 9 MG/ML
20 INJECTION, SOLUTION INTRAVENOUS ONCE
Status: COMPLETED | OUTPATIENT
Start: 2023-03-07 | End: 2023-03-07

## 2023-03-07 RX ORDER — SODIUM CHLORIDE 9 MG/ML
20 INJECTION, SOLUTION INTRAVENOUS ONCE
Status: CANCELLED | OUTPATIENT
Start: 2023-03-07

## 2023-03-07 RX ADMIN — SODIUM CHLORIDE 20 ML/HR: 9 INJECTION, SOLUTION INTRAVENOUS at 15:06

## 2023-03-07 RX ADMIN — FERUMOXYTOL 510 MG: 510 INJECTION INTRAVENOUS at 15:05

## 2023-03-07 NOTE — PROGRESS NOTES
Pt here for feraheme infusion, pt offered no complaints today  Pt resting comfortably with call bell in place

## 2023-03-27 ENCOUNTER — OFFICE VISIT (OUTPATIENT)
Dept: FAMILY MEDICINE CLINIC | Facility: CLINIC | Age: 68
End: 2023-03-27

## 2023-03-27 VITALS
WEIGHT: 207 LBS | DIASTOLIC BLOOD PRESSURE: 86 MMHG | TEMPERATURE: 97.8 F | SYSTOLIC BLOOD PRESSURE: 144 MMHG | OXYGEN SATURATION: 98 % | HEIGHT: 69 IN | HEART RATE: 72 BPM | BODY MASS INDEX: 30.66 KG/M2 | RESPIRATION RATE: 18 BRPM

## 2023-03-27 DIAGNOSIS — I10 PRIMARY HYPERTENSION: ICD-10-CM

## 2023-03-27 DIAGNOSIS — R60.0 LOWER EXTREMITY EDEMA: ICD-10-CM

## 2023-03-27 DIAGNOSIS — D50.9 IRON DEFICIENCY ANEMIA, UNSPECIFIED IRON DEFICIENCY ANEMIA TYPE: Primary | ICD-10-CM

## 2023-03-27 RX ORDER — LOSARTAN POTASSIUM 25 MG/1
25 TABLET ORAL DAILY
Qty: 90 TABLET | Refills: 1 | Status: SHIPPED | OUTPATIENT
Start: 2023-03-27 | End: 2023-06-25

## 2023-03-27 NOTE — PROGRESS NOTES
Name: Devora Dowell      : 1955      MRN: [id number]  Encounter Provider: Girard Kayser, DO  Encounter Date: 3/27/2023   Encounter department: [address] [place] [address]     1  Iron deficiency anemia, unspecified iron deficiency anemia type  Assessment & Plan:  Patient admitted from 22 to 5/15/22 for symptomatic anemia with hemoglobin of 3 2 an MCV of 58  Received 4 units of PRBC and Venofer x1 with appropriate improvement  No over source of bleeding was identified   - EGD/colonoscopy 22:  Unremarkable  - follows with heme, last appt 2023 and is now s/p 2 iron infusions with good results  · Patient was initially told that she will get 4 infusions and then to follow-up in a year with lab work prior  We will reach out to heme to confirm if she needs further infusions or need to re-start her PO iron  · Next appt with heme scheduled for 2024  - this is now stable      2  Primary hypertension  Assessment & Plan:  - current regimen: losartan 25 mg daily; has lasix 40 mg prn for peripheral edema  - home -135/70-80s on average  - continue low salt diet and exercise  - recommend increasing cardio for endurance slowly to get about 30 mins of exercise daily  - f/u 4 months for MAW    Orders:  -     losartan (COZAAR) 25 mg tablet; Take 1 tablet (25 mg total) by mouth daily    3  Lower extremity edema  Assessment & Plan:  - no evidence of edema today  - has been taking lasix only has needed, can continue to do this  - reviewed sticking to low-salt diet and elevating legs when needed  - will monitor closely           Subjective      75 y/o F presents BP, PAIGE, and constipation follow up  Continues on losartan 25 mg daily' BP stable at home in 120s-135/70-80s  She recently followed with hematology and is now s/p 2 iron infusions and she reports that she is feeling great  Most recent labwork looks stable   Patient is confused about how many infusions she will need and when to get bloodwork prior to next visit with laura in 2/2024  She otherwise is feeling well, reports that she has been exercising and feels more energetic than she used to but notes that her energy could still use some work  She used to previously be unable to climb her staircase (13 stairs) without taking breaks or going to slowly  She is now able to climb them in 1 go  She is also now interested in looking for a part-time job but will wait until her energy levels have improved a little bit more  Also reporting that she does fall asleep around 2:30 PM almost daily but only happens when she is sitting  Has any episodes of presyncope/syncope  She wakes up within an hour or 2  Review of Systems   Constitutional: Negative for chills and fever  HENT: Negative for congestion and rhinorrhea  Eyes: Negative for visual disturbance  Respiratory: Negative for cough and shortness of breath  Cardiovascular: Negative for chest pain and palpitations  Gastrointestinal: Negative for abdominal pain, constipation, diarrhea, nausea and vomiting  Genitourinary: Negative for dysuria  Musculoskeletal: Negative for arthralgias  Neurological: Negative for dizziness, light-headedness and headaches  Psychiatric/Behavioral: Positive for sleep disturbance         Current Outpatient Medications on File Prior to Visit   Medication Sig   • carbamide peroxide (DEBROX) 6 5 % otic solution Administer 5 drops into both ears 2 (two) times a day   • furosemide (LASIX) 20 mg tablet Take 2 tablets (40 mg total) by mouth daily   • triamcinolone (KENALOG) 0 1 % ointment Apply topically 2 (two) times a day   • [DISCONTINUED] losartan (COZAAR) 25 mg tablet Take 1 tablet (25 mg total) by mouth daily   • docusate sodium (COLACE) 100 mg capsule Take 1 capsule (100 mg total) by mouth 2 (two) times a day (Patient not taking: Reported on 12/5/2022)   • ferrous sulfate 324 (65 Fe) mg Take 1 tablet (324 "mg total) by mouth daily before breakfast (Patient not taking: Reported on 2/28/2023)       Objective     /86 (BP Location: Left arm, Patient Position: Sitting, Cuff Size: Standard)   Pulse 72   Temp 97 8 °F (36 6 °C) (Temporal)   Resp 18   Ht 5' 9\" (1 753 m)   Wt 93 9 kg (207 lb)   SpO2 98%   BMI 30 57 kg/m²     Physical Exam  Vitals reviewed  Constitutional:       General: She is not in acute distress  Appearance: Normal appearance  HENT:      Head: Normocephalic and atraumatic  Right Ear: External ear normal       Left Ear: External ear normal       Nose: Nose normal       Mouth/Throat:      Pharynx: Oropharynx is clear  Eyes:      Conjunctiva/sclera: Conjunctivae normal    Cardiovascular:      Rate and Rhythm: Normal rate and regular rhythm  Pulses: Normal pulses  Heart sounds: Normal heart sounds  Pulmonary:      Effort: Pulmonary effort is normal       Breath sounds: Normal breath sounds  Abdominal:      Palpations: Abdomen is soft  Musculoskeletal:         General: No swelling  Cervical back: Neck supple  Right lower leg: No edema  Left lower leg: No edema  Skin:     General: Skin is warm  Capillary Refill: Capillary refill takes less than 2 seconds  Neurological:      Mental Status: She is alert and oriented to person, place, and time  Gait: Gait normal    Psychiatric:         Mood and Affect: Mood normal          Behavior: Behavior normal          Thought Content:  Thought content normal          Judgment: Judgment normal        Gurinder King DO  "

## 2023-03-27 NOTE — ASSESSMENT & PLAN NOTE
- no evidence of edema today  - has been taking lasix only has needed, can continue to do this  - reviewed sticking to low-salt diet and elevating legs when needed  - will monitor closely

## 2023-03-27 NOTE — ASSESSMENT & PLAN NOTE
Patient admitted from 05/11/22 to 5/15/22 for symptomatic anemia with hemoglobin of 3 2 an MCV of 58  Received 4 units of PRBC and Venofer x1 with appropriate improvement  No over source of bleeding was identified   - EGD/colonoscopy 5/13/22:  Unremarkable  - follows with heme, last appt 2/2023 and is now s/p 2 iron infusions with good results  · Patient was initially told that she will get 4 infusions and then to follow-up in a year with lab work prior    We will reach out to heme to confirm if she needs further infusions or need to re-start her PO iron  · Next appt with heme scheduled for 2/2024  - this is now stable

## 2023-03-27 NOTE — ASSESSMENT & PLAN NOTE
- current regimen: losartan 25 mg daily; has lasix 40 mg prn for peripheral edema  - home -135/70-80s on average  - continue low salt diet and exercise  - recommend increasing cardio for endurance slowly to get about 30 mins of exercise daily  - f/u 4 months for SARAH

## 2024-02-02 ENCOUNTER — TELEPHONE (OUTPATIENT)
Dept: HEMATOLOGY ONCOLOGY | Facility: CLINIC | Age: 69
End: 2024-02-02

## 2024-02-02 DIAGNOSIS — D50.9 IRON DEFICIENCY ANEMIA, UNSPECIFIED IRON DEFICIENCY ANEMIA TYPE: Primary | ICD-10-CM

## 2024-02-02 DIAGNOSIS — E46 PROTEIN-CALORIE MALNUTRITION, UNSPECIFIED SEVERITY (HCC): ICD-10-CM

## 2024-02-02 NOTE — TELEPHONE ENCOUNTER
Spoke with patient about DANIELLE to Karol Gomez. Pt aware future follow ups might be at [place] location.    Pt asked about Lab work needed for hear upcoming appt. She will be going Monday or Tuesday to get labs done. Pt stated she feels tired lately and her family expressed concerns that she might need another Iron infusion.     Message routed to Sigifredo for review and to enter labs.

## 2024-02-02 NOTE — TELEPHONE ENCOUNTER
Call out to patient and reviewed labs are recommended to be done prior to 2/8/24. Patient verbalized understanding.

## 2024-02-05 ENCOUNTER — APPOINTMENT (OUTPATIENT)
Dept: LAB | Facility: CLINIC | Age: 69
End: 2024-02-05
Payer: MEDICARE

## 2024-02-05 DIAGNOSIS — D50.9 IRON DEFICIENCY ANEMIA, UNSPECIFIED IRON DEFICIENCY ANEMIA TYPE: ICD-10-CM

## 2024-02-05 DIAGNOSIS — E46 PROTEIN-CALORIE MALNUTRITION, UNSPECIFIED SEVERITY (HCC): ICD-10-CM

## 2024-02-05 LAB
ALBUMIN SERPL BCP-MCNC: 4.5 G/DL (ref 3.5–5)
ALP SERPL-CCNC: 86 U/L (ref 34–104)
ALT SERPL W P-5'-P-CCNC: 15 U/L (ref 7–52)
ANION GAP SERPL CALCULATED.3IONS-SCNC: 8 MMOL/L
AST SERPL W P-5'-P-CCNC: 21 U/L (ref 13–39)
BASOPHILS # BLD AUTO: 0.07 THOUSANDS/ÂΜL (ref 0–0.1)
BASOPHILS NFR BLD AUTO: 1 % (ref 0–1)
BILIRUB SERPL-MCNC: 0.63 MG/DL (ref 0.2–1)
BUN SERPL-MCNC: 13 MG/DL (ref 5–25)
CALCIUM SERPL-MCNC: 10 MG/DL (ref 8.4–10.2)
CHLORIDE SERPL-SCNC: 104 MMOL/L (ref 96–108)
CO2 SERPL-SCNC: 30 MMOL/L (ref 21–32)
CREAT SERPL-MCNC: 0.8 MG/DL (ref 0.6–1.3)
EOSINOPHIL # BLD AUTO: 0.49 THOUSAND/ÂΜL (ref 0–0.61)
EOSINOPHIL NFR BLD AUTO: 8 % (ref 0–6)
ERYTHROCYTE [DISTWIDTH] IN BLOOD BY AUTOMATED COUNT: 13.2 % (ref 11.6–15.1)
FERRITIN SERPL-MCNC: 48 NG/ML (ref 11–307)
FOLATE SERPL-MCNC: 9 NG/ML
GFR SERPL CREATININE-BSD FRML MDRD: 77 ML/MIN/1.73SQ M
GLUCOSE P FAST SERPL-MCNC: 90 MG/DL (ref 65–99)
HCT VFR BLD AUTO: 48.1 % (ref 34.8–46.1)
HGB BLD-MCNC: 15.5 G/DL (ref 11.5–15.4)
IMM GRANULOCYTES # BLD AUTO: 0.02 THOUSAND/UL (ref 0–0.2)
IMM GRANULOCYTES NFR BLD AUTO: 0 % (ref 0–2)
IRON SATN MFR SERPL: 31 % (ref 15–50)
IRON SERPL-MCNC: 107 UG/DL (ref 50–212)
LYMPHOCYTES # BLD AUTO: 1.65 THOUSANDS/ÂΜL (ref 0.6–4.47)
LYMPHOCYTES NFR BLD AUTO: 28 % (ref 14–44)
MCH RBC QN AUTO: 29.2 PG (ref 26.8–34.3)
MCHC RBC AUTO-ENTMCNC: 32.2 G/DL (ref 31.4–37.4)
MCV RBC AUTO: 91 FL (ref 82–98)
MONOCYTES # BLD AUTO: 0.44 THOUSAND/ÂΜL (ref 0.17–1.22)
MONOCYTES NFR BLD AUTO: 7 % (ref 4–12)
NEUTROPHILS # BLD AUTO: 3.3 THOUSANDS/ÂΜL (ref 1.85–7.62)
NEUTS SEG NFR BLD AUTO: 56 % (ref 43–75)
NRBC BLD AUTO-RTO: 0 /100 WBCS
PLATELET # BLD AUTO: 286 THOUSANDS/UL (ref 149–390)
PMV BLD AUTO: 11 FL (ref 8.9–12.7)
POTASSIUM SERPL-SCNC: 4.4 MMOL/L (ref 3.5–5.3)
PROT SERPL-MCNC: 7.7 G/DL (ref 6.4–8.4)
RBC # BLD AUTO: 5.31 MILLION/UL (ref 3.81–5.12)
RETICS # AUTO: NORMAL 10*3/UL (ref 14097–95744)
RETICS # CALC: 1.6 % (ref 0.37–1.87)
SODIUM SERPL-SCNC: 142 MMOL/L (ref 135–147)
TIBC SERPL-MCNC: 350 UG/DL (ref 250–450)
UIBC SERPL-MCNC: 243 UG/DL (ref 155–355)
VIT B12 SERPL-MCNC: 266 PG/ML (ref 180–914)
WBC # BLD AUTO: 5.97 THOUSAND/UL (ref 4.31–10.16)

## 2024-02-05 PROCEDURE — 36415 COLL VENOUS BLD VENIPUNCTURE: CPT

## 2024-02-05 PROCEDURE — 82607 VITAMIN B-12: CPT

## 2024-02-05 PROCEDURE — 82728 ASSAY OF FERRITIN: CPT

## 2024-02-05 PROCEDURE — 83540 ASSAY OF IRON: CPT

## 2024-02-05 PROCEDURE — 85025 COMPLETE CBC W/AUTO DIFF WBC: CPT

## 2024-02-05 PROCEDURE — 83550 IRON BINDING TEST: CPT

## 2024-02-05 PROCEDURE — 85045 AUTOMATED RETICULOCYTE COUNT: CPT

## 2024-02-05 PROCEDURE — 82746 ASSAY OF FOLIC ACID SERUM: CPT

## 2024-02-05 PROCEDURE — 80053 COMPREHEN METABOLIC PANEL: CPT

## 2024-02-08 ENCOUNTER — OFFICE VISIT (OUTPATIENT)
Dept: HEMATOLOGY ONCOLOGY | Facility: CLINIC | Age: 69
End: 2024-02-08
Payer: MEDICARE

## 2024-02-08 VITALS
HEIGHT: 69 IN | DIASTOLIC BLOOD PRESSURE: 74 MMHG | HEART RATE: 70 BPM | BODY MASS INDEX: 31.25 KG/M2 | WEIGHT: 211 LBS | TEMPERATURE: 97.8 F | OXYGEN SATURATION: 98 % | SYSTOLIC BLOOD PRESSURE: 126 MMHG | RESPIRATION RATE: 16 BRPM

## 2024-02-08 DIAGNOSIS — E46 PROTEIN-CALORIE MALNUTRITION, UNSPECIFIED SEVERITY (HCC): ICD-10-CM

## 2024-02-08 DIAGNOSIS — D50.9 IRON DEFICIENCY ANEMIA, UNSPECIFIED IRON DEFICIENCY ANEMIA TYPE: ICD-10-CM

## 2024-02-08 DIAGNOSIS — E53.8 VITAMIN B 12 DEFICIENCY: Primary | ICD-10-CM

## 2024-02-08 DIAGNOSIS — E53.8 FOLATE DEFICIENCY: ICD-10-CM

## 2024-02-08 PROCEDURE — 99215 OFFICE O/P EST HI 40 MIN: CPT | Performed by: PHYSICIAN ASSISTANT

## 2024-02-08 RX ORDER — FOLIC ACID 1 MG/1
1 TABLET ORAL DAILY
Qty: 90 TABLET | Refills: 1 | Status: SHIPPED | OUTPATIENT
Start: 2024-02-08

## 2024-02-08 NOTE — PROGRESS NOTES
[address]  [facility] [place]  [address] [facility] [place] [address]  Hematology Ambulatory Follow-Up  Marilia Velásquez, 1955, [id number]  2/8/2024      Assessment and Plan   1. Vitamin B 12 deficiency; 2. Folate deficiency  This is a 68-year-old female with past medical history of the above.  Patient underwent IV iron supplementation secondary to profound anemia.  Patient notes no limitations to her diet presently however, blood work is demonstrated patient's folic acid and B12 levels are low.  We discussed supplementation.     Regimen:  Folic Acid 1 mg PO daily x 6 months  Vit B 3000 mcg Gummy daily x 2 months then 1500 mcg daily x 4 months    - folic acid (KP Folic Acid) 1 mg tablet; Take 1 tablet (1 mg total) by mouth daily  Dispense: 90 tablet; Refill: 1    Patient will return to the office in 6 months with blood work prior.    3. Iron deficiency anemia, unspecified iron deficiency anemia type; 4. Protein-calorie malnutrition, unspecified severity (HCC)  Stable.     His last IV iron treatment was in February/March 2023.  Patient's ferritin is at a 48, patient continues to take oral iron supplements every other day.  Patient was advised to continue this practice.      - CBC and differential; Future  - Iron Panel (Includes Ferritin, Iron Sat%, Iron, and TIBC); Future    5.  Relative polycythemia  Patient was dehydrated at the time of blood draw.  We will continue to observe.  Subsequent blood testing can be non fasting.    The patient is scheduled for follow-up in approximately 6 months.     Patient voiced agreement and understanding to the above.   Patient advised to call the Hematology/Oncology office with any questions and concerns regarding the above.    Barrier(s) to care: None  The patient is able to self care.    Karol Gomez, PA-C  Medical Oncology/Hematology  [facility]    Subjective     Chief Complaint   Patient presents with     Follow-up       History of present illness:   This is a 68-year-old female with past medical history of profound anemia with hemoglobin in the 3 g/dL range during an admission over a year ago.  Patient underwent IV iron treatments, with good results.    5/11/2022 hemoglobin 3.2, MCV 58, RDW 21.9, platelet count of 469, white blood cell count of 8.8.    5/13/2022: Colonoscopy & EGD   2 polyps - follow up 7 years  Normal espohagus, Large hiatal hernia  Normal stomach and duodenum  Bx H.pylori and Celiac Disease    Etiology of iron deficiency was never obtained.  Patient had 1 dose of Venofer in the hospital.    Patient's subsequent laboratory testing demonstrated continued low levels of ferritin in February 2023= 40.    2/28 through 3/7/2023 Feraheme 510 mg IV x 2 doses    2/5/2024: WBC 5.9, hemoglobin 15.5, MCV 91, platelet count 286, ferritin 48   Folate 9.0, vitamin B12 266    Interval history:  feeling well.      Review of Systems   Constitutional:  Negative for appetite change, fatigue, fever and unexpected weight change. Chills: feraheme.  HENT:  Negative for nosebleeds.    Respiratory:  Negative for cough, choking and shortness of breath.         Negative hemoptysis.   Cardiovascular:  Negative for chest pain, palpitations and leg swelling.   Gastrointestinal: Negative.  Negative for abdominal distention, abdominal pain, anal bleeding, blood in stool, constipation, diarrhea, nausea and vomiting.   Endocrine: Negative.  Negative for cold intolerance.   Genitourinary: Negative.  Negative for hematuria, menstrual problem, vaginal bleeding, vaginal discharge and vaginal pain.   Musculoskeletal: Negative.  Negative for arthralgias, myalgias, neck pain and neck stiffness.   Skin: Negative.  Negative for color change, pallor and rash.   Allergic/Immunologic: Negative.  Negative for immunocompromised state.   Neurological: Negative.  Negative for weakness and headaches.   Hematological:  Negative for adenopathy.  Does not bruise/bleed easily.   All other systems reviewed and are negative.    Patient Active Problem List   Diagnosis    FALLON (dyspnea on exertion)    Iron deficiency anemia    High output heart failure (HCC)    Hypertension    Colon polyps    Lower extremity edema    Drug-induced constipation    Decreased appetite    Eczema     Past Medical History:   Diagnosis Date    Anemia May 11, 2022    Admitted to [facility].    Hypertension May 11, 2022    Admitted to [facility].     Past Surgical History:   Procedure Laterality Date    COLONOSCOPY  May 13, 2022    Two polyps removed    TONSILLECTOMY      UPPER GASTROINTESTINAL ENDOSCOPY  May 13, 2022     Family History   Problem Relation Age of Onset    No Known Problems Mother     No Known Problems Father     Stomach cancer Paternal Grandmother      Social History     Socioeconomic History    Marital status: Single     Spouse name: None    Number of children: None    Years of education: None    Highest education level: None   Occupational History    None   Tobacco Use    Smoking status: Never     Passive exposure: Never    Smokeless tobacco: Never   Vaping Use    Vaping status: Never Used   Substance and Sexual Activity    Alcohol use: Not Currently     Comment: do not drink    Drug use: Never    Sexual activity: Not Currently     Partners: Male     Birth control/protection: Post-menopausal   Other Topics Concern    None   Social History Narrative    None     Social Determinants of Health     Financial Resource Strain: Low Risk  (5/25/2022)    Overall Financial Resource Strain (CARDIA)     Difficulty of Paying Living Expenses: Not hard at all   Food Insecurity: No Food Insecurity (5/25/2022)    Hunger Vital Sign     Worried About Running Out of Food in the Last Year: Never true     Ran Out of Food in the Last Year: Never true   Transportation Needs: No Transportation Needs (5/25/2022)    PRAPARE - Transportation     Lack of Transportation (Medical): No     Lack  of Transportation (Non-Medical): No   Physical Activity: Not on file   Stress: No Stress Concern Present (10/21/2022)    [employer] - Occupational Stress Questionnaire     Feeling of Stress : Not at all   Social Connections: Not on file   Intimate Partner Violence: Not At Risk (10/21/2022)    Humiliation, Afraid, Rape, and Kick questionnaire     Fear of Current or Ex-Partner: No     Emotionally Abused: No     Physically Abused: No     Sexually Abused: No   Housing Stability: Low Risk  (10/21/2022)    Housing Stability Vital Sign     Unable to Pay for Housing in the Last Year: No     Number of Places Lived in the Last Year: 1     Unstable Housing in the Last Year: No       Current Outpatient Medications:     ferrous sulfate 324 (65 Fe) mg, Take 1 tablet (324 mg total) by mouth daily before breakfast (Patient taking differently: Take 324 mg by mouth every other day), Disp: 90 tablet, Rfl: 3    folic acid (KP Folic Acid) 1 mg tablet, Take 1 tablet (1 mg total) by mouth daily, Disp: 90 tablet, Rfl: 1    furosemide (LASIX) 20 mg tablet, Take 2 tablets (40 mg total) by mouth daily (Patient taking differently: Take 40 mg by mouth if needed), Disp: 30 tablet, Rfl: 2    losartan (COZAAR) 25 mg tablet, Take 1 tablet (25 mg total) by mouth daily, Disp: 90 tablet, Rfl: 1    triamcinolone (KENALOG) 0.1 % ointment, Apply topically 2 (two) times a day, Disp: 30 g, Rfl: 0    carbamide peroxide (DEBROX) 6.5 % otic solution, Administer 5 drops into both ears 2 (two) times a day (Patient not taking: Reported on 2/8/2024), Disp: 15 mL, Rfl: 1    docusate sodium (COLACE) 100 mg capsule, Take 1 capsule (100 mg total) by mouth 2 (two) times a day (Patient not taking: Reported on 12/5/2022), Disp: 60 capsule, Rfl: 1  Allergies   Allergen Reactions    Shellfish-Derived Products - Food Allergy GI Intolerance and Vomiting    Latex Rash       Objective   /74 (BP Location: Left arm, Patient Position: Sitting,  "Cuff Size: Adult)   Pulse 70   Temp 97.8 °F (36.6 °C) (Temporal)   Resp 16   Ht 5' 9\" (1.753 m)   Wt 95.7 kg (211 lb)   SpO2 98%   BMI 31.16 kg/m²    Physical Exam  Constitutional:       General: She is not in acute distress.     Appearance: She is well-developed.   HENT:      Head: Normocephalic and atraumatic.      Mouth/Throat:      Pharynx: No oropharyngeal exudate.   Eyes:      General: No scleral icterus.     Pupils: Pupils are equal, round, and reactive to light.   Cardiovascular:      Rate and Rhythm: Normal rate and regular rhythm.      Heart sounds: No murmur heard.  Pulmonary:      Effort: Pulmonary effort is normal. No respiratory distress.      Breath sounds: Normal breath sounds.   Abdominal:      General: Bowel sounds are normal. There is no distension.      Palpations: Abdomen is soft.      Tenderness: There is no abdominal tenderness.   Musculoskeletal:         General: Normal range of motion.      Cervical back: Normal range of motion.   Lymphadenopathy:      Cervical: No cervical adenopathy.   Skin:     General: Skin is warm.      Coloration: Skin is not pale.      Findings: No rash.   Neurological:      Mental Status: She is alert and oriented to person, place, and time.      Cranial Nerves: No cranial nerve deficit.   Psychiatric:         Thought Content: Thought content normal.         Result Review  Labs:  Appointment on 02/05/2024   Component Date Value Ref Range Status    WBC 02/05/2024 5.97  4.31 - 10.16 Thousand/uL Final    RBC 02/05/2024 5.31 (H)  3.81 - 5.12 Million/uL Final    Hemoglobin 02/05/2024 15.5 (H)  11.5 - 15.4 g/dL Final    Hematocrit 02/05/2024 48.1 (H)  34.8 - 46.1 % Final    MCV 02/05/2024 91  82 - 98 fL Final    MCH 02/05/2024 29.2  26.8 - 34.3 pg Final    MCHC 02/05/2024 32.2  31.4 - 37.4 g/dL Final    RDW 02/05/2024 13.2  11.6 - 15.1 % Final    MPV 02/05/2024 11.0  8.9 - 12.7 fL Final    Platelets 02/05/2024 286  149 - 390 Thousands/uL Final    nRBC 02/05/2024 0  " /100 WBCs Final    Neutrophils Relative 02/05/2024 56  43 - 75 % Final    Immat GRANS % 02/05/2024 0  0 - 2 % Final    Lymphocytes Relative 02/05/2024 28  14 - 44 % Final    Monocytes Relative 02/05/2024 7  4 - 12 % Final    Eosinophils Relative 02/05/2024 8 (H)  0 - 6 % Final    Basophils Relative 02/05/2024 1  0 - 1 % Final    Neutrophils Absolute 02/05/2024 3.30  1.85 - 7.62 Thousands/µL Final    Immature Grans Absolute 02/05/2024 0.02  0.00 - 0.20 Thousand/uL Final    Lymphocytes Absolute 02/05/2024 1.65  0.60 - 4.47 Thousands/µL Final    Monocytes Absolute 02/05/2024 0.44  0.17 - 1.22 Thousand/µL Final    Eosinophils Absolute 02/05/2024 0.49  0.00 - 0.61 Thousand/µL Final    Basophils Absolute 02/05/2024 0.07  0.00 - 0.10 Thousands/µL Final    Retic Ct Abs 02/05/2024 85,000  14,097 - 95,744 Final    Retic Ct Pct 02/05/2024 1.60  0.37 - 1.87 % Final    Sodium 02/05/2024 142  135 - 147 mmol/L Final    Potassium 02/05/2024 4.4  3.5 - 5.3 mmol/L Final    Chloride 02/05/2024 104  96 - 108 mmol/L Final    CO2 02/05/2024 30  21 - 32 mmol/L Final    ANION GAP 02/05/2024 8  mmol/L Final    BUN 02/05/2024 13  5 - 25 mg/dL Final    Creatinine 02/05/2024 0.80  0.60 - 1.30 mg/dL Final    Standardized to IDMS reference method    Glucose, Fasting 02/05/2024 90  65 - 99 mg/dL Final    Calcium 02/05/2024 10.0  8.4 - 10.2 mg/dL Final    AST 02/05/2024 21  13 - 39 U/L Final    ALT 02/05/2024 15  7 - 52 U/L Final    Specimen collection should occur prior to Sulfasalazine administration due to the potential for falsely depressed results.     Alkaline Phosphatase 02/05/2024 86  34 - 104 U/L Final    Total Protein 02/05/2024 7.7  6.4 - 8.4 g/dL Final    Albumin 02/05/2024 4.5  3.5 - 5.0 g/dL Final    Total Bilirubin 02/05/2024 0.63  0.20 - 1.00 mg/dL Final    Use of this assay is not recommended for patients undergoing treatment with eltrombopag due to the potential for falsely elevated results.  N-acetyl-p-benzoquinone imine  (metabolite of Acetaminophen) will generate erroneously low results in samples for patients that have taken an overdose of Acetaminophen.    eGFR 02/05/2024 77  ml/min/1.73sq m Final    Vitamin B-12 02/05/2024 266  180 - 914 pg/mL Final    Folate 02/05/2024 9.0  >5.9 ng/mL Final    The World Health Organization has determined deficient folate concentrations are considered to be <4.0 ng/mL.    Iron Saturation 02/05/2024 31  15 - 50 % Final    TIBC 02/05/2024 350  250 - 450 ug/dL Final    Iron 02/05/2024 107  50 - 212 ug/dL Final    Patients treated with metal-binding drugs (ie. Deferoxamine) may have depressed iron values.    UIBC 02/05/2024 243  155 - 355 ug/dL Final    Ferritin 02/05/2024 48  11 - 307 ng/mL Final       Please note:  This report has been generated by a voice recognition software system. Therefore there may be syntax, spelling, and/or grammatical errors. Please call if you have any questions.

## 2024-02-08 NOTE — PATIENT INSTRUCTIONS
[facility] Medical Oncology and Hematology Team  [facility] - [phone]    Your Team Members:  Advanced Practitioner:  Karol Gomez PA-C  Oncology Nurse:   EDWIN Barnhart ([phone]) M-F 8am - 4:30pm    Please answer Private and Unavailable Calls - this may be your team(s) contacting you.  If you have medical questions/concerns/issues - contact us either by (1) My Chart (2) [facility]       B 12 extra strength 3000 mcg daily x 2 months then 1500 mcg daily